# Patient Record
Sex: MALE | Race: WHITE | Employment: OTHER | URBAN - METROPOLITAN AREA
[De-identification: names, ages, dates, MRNs, and addresses within clinical notes are randomized per-mention and may not be internally consistent; named-entity substitution may affect disease eponyms.]

---

## 2017-06-06 ENCOUNTER — HOSPITAL ENCOUNTER (OUTPATIENT)
Dept: OTHER | Age: 52
Discharge: HOME | End: 2017-06-30
Attending: FAMILY MEDICINE | Admitting: FAMILY MEDICINE

## 2017-06-08 ENCOUNTER — HOSPITAL ENCOUNTER (OUTPATIENT)
Dept: PHYSICAL THERAPY | Age: 52
Discharge: HOME | End: 2017-06-09
Admitting: FAMILY MEDICINE

## 2017-06-14 ENCOUNTER — HOSPITAL ENCOUNTER (OUTPATIENT)
Dept: PHYSICAL THERAPY | Age: 52
Discharge: HOME | End: 2017-06-15
Admitting: FAMILY MEDICINE

## 2019-06-12 ENCOUNTER — HOSPITAL ENCOUNTER (EMERGENCY)
Age: 54
Discharge: HOME | End: 2019-06-12
Payer: MEDICARE

## 2019-06-12 VITALS
RESPIRATION RATE: 18 BRPM | OXYGEN SATURATION: 96 % | WEIGHT: 217.15 LBS | DIASTOLIC BLOOD PRESSURE: 95 MMHG | HEIGHT: 76 IN | SYSTOLIC BLOOD PRESSURE: 168 MMHG | TEMPERATURE: 98.4 F | BODY MASS INDEX: 26.44 KG/M2 | HEART RATE: 81 BPM

## 2019-06-12 DIAGNOSIS — L02.511 ABSCESS OF RIGHT HAND: Primary | ICD-10-CM

## 2019-06-12 PROCEDURE — 87205 SMEAR GRAM STAIN: CPT

## 2019-06-12 PROCEDURE — 87070 CULTURE OTHR SPECIMN AEROBIC: CPT

## 2019-06-12 PROCEDURE — 87077 CULTURE AEROBIC IDENTIFY: CPT

## 2019-06-12 PROCEDURE — 4500000023 HC ED LEVEL 3 PROCEDURE

## 2019-06-12 PROCEDURE — 99283 EMERGENCY DEPT VISIT LOW MDM: CPT

## 2019-06-12 PROCEDURE — 87186 SC STD MICRODIL/AGAR DIL: CPT

## 2019-06-12 ASSESSMENT — PAIN DESCRIPTION - DESCRIPTORS: DESCRIPTORS: THROBBING

## 2019-06-12 ASSESSMENT — PAIN DESCRIPTION - LOCATION: LOCATION: HAND

## 2019-06-12 ASSESSMENT — PAIN SCALES - GENERAL: PAINLEVEL_OUTOF10: 5

## 2019-06-12 ASSESSMENT — PAIN DESCRIPTION - PAIN TYPE: TYPE: ACUTE PAIN

## 2019-06-12 ASSESSMENT — PAIN DESCRIPTION - ORIENTATION: ORIENTATION: RIGHT

## 2019-06-13 NOTE — ED PROVIDER NOTES
[address]       Pt Name: Danial Díaz  MRN: [id number]  [place] 1965  Date of evaluation: 6/12/2019  Provider: MONICA Olmos    The ED Attending Physician was available for consultation but did not see or evaluate this patient. CHIEF COMPLAINT       Chief Complaint   Patient presents with   [address]     reports spider bite 6 days ago. seen by pcp, started antibiotics and steroids then changed again, still not better, sent pcp. HISTORY OF PRESENT ILLNESS  (Location/Symptom, Timing/Onset, Context/Setting, Quality, Duration, Modifying Factors, Severity.)   Danial Díaz is a 62 y.o. male who presents to the emergency department with complaint of a right hand infection with a area of pain and swelling on the back of the hand. He says his symptoms started about 6 days ago, and he thinks he may have been bit by something but he is not sure, and he denies any injury to the hand. He says it began as a little bump but then it got bigger and more painful. He reported that he visited his family doctor, was started on one antibiotic and steroids, and he later called back because it was not getting much better and the doctor changed his antibiotic. He says he does not want to take the steroids anymore because they are giving him bad side effects. He reports that the infection now seems to be remaining the same, not getting much worse or better, but there is a large lump on the hand, and he is unable to drain anything out of it. He says it is quite painful, and initially this area of his hand was itchy but is no longer very itchy because it so painful. He denies numbness. Denies any symptoms anywhere else on the body or any other known recent infections. Denies any chronic abscesses or any diabetes or other relevant medical problems. No other complaints. Nursing Notes were reviewed and I agree.     REVIEW OF SYSTEMS    (2-9 systems for level 4, 10 or more for level 5)     Constitutional:  Negative for fever, chills. Respiratory:  Negative for cough, shortness of breath. Cardiovascular:  Negative for chest pain, palpitations. Gastrointestinal:  Negative for nausea, vomiting, abdominal pain. Genitourinary:  Negative for dysuria, hematuria, flank pain, and pelvic pain. Musculoskeletal: Positive for pain and swelling in the back of the right hand. Negative for myalgias, arthralgias, neck pain and neck stiffness. Neurological:  Negative for dizziness, weakness, light-headedness, numbness and headaches. Except as noted above the remainder of the review of systems was reviewed and negative. PAST MEDICAL HISTORY         Diagnosis Date    COPD (chronic obstructive pulmonary disease) ([facility] [address]     Emphysema lung (HCC)     GERD (gastroesophageal reflux disease)        SURGICAL HISTORY           Procedure Laterality Date    HERNIA REPAIR      ROTATOR CUFF REPAIR Bilateral        CURRENT MEDICATIONS       Previous Medications    DICLOFENAC (VOLTAREN) 75 MG EC TABLET    Take 75 mg by mouth 2 times daily. OXYCODONE-ACETAMINOPHEN (PERCOCET) 5-325 MG PER TABLET    Take 1 tablet by mouth every 4 hours as needed for Pain    SENNOSIDES (SENNA) 8.8 MG/5ML SYRP    Take 5 mLs by mouth nightly    SILODOSIN (RAPAFLO) 4 MG CAPS    Take 4 mg by mouth nightly       ALLERGIES     Patient has no known allergies. FAMILY HISTORY     No family history on file. No family status information on file. SOCIAL HISTORY      reports that he has quit smoking. His smoking use included cigarettes. He has a 38.00 pack-year smoking history. He does not have any smokeless tobacco history on file. He reports that he drinks alcohol. He reports that he does not use drugs.     PHYSICAL EXAM    (up to 7 for level 4, 8 or more for level 5)     ED Triage Vitals [06/12/19 2131]   BP Temp Temp Source Pulse Resp SpO2 Height Weight   (!) 168/95 98.4 °F (36.9 °C) Oral 81 18 96 % 6' 4\" (1.93 m) 217 lb 2.5 oz (98.5 kg)       Constitutional:  Appearing well-developed and well-nourished. No distress. HENT:  Normocephalic and atraumatic. Cardiovascular:  Normal rate, regular rhythm, normal heart sounds and intact distal pulses. Pulmonary/Chest:  Effort normal and breath sounds normal. No respiratory distress. Musculoskeletal: Approximately 3 cm round area of tenderness, swelling, erythema and fluctuance approximately over the distal fifth metacarpal, negative for active drainage, with normal examination of the surrounding joints. See image below. normal range of motion. No edema exhibited. Sensation to light touch grossly intact and capillary refill <3 seconds in the digits of the right upper extremity. Neurological:  Oriented to person, place, and time. No cranial nerve deficit. Skin:  Skin is warm and dry. Not diaphoretic. Psychiatric:  Normal mood, affect, behavior, judgment and thought content. DIAGNOSTIC RESULTS     RADIOLOGY:   Non-plain film images such as CT, Ultrasound and MRI are read by the radiologist. Plain radiographic images are visualized and preliminarily interpreted by MONICA Lebron with the below findings:    None. Interpretation per the Radiologist below, if available at the time of this note:    No orders to display       LABS:  [address]       All other labs were within normal range or not returned as of this dictation. EMERGENCY DEPARTMENT COURSE and DIFFERENTIAL DIAGNOSIS/MDM:   Vitals:    Vitals:    06/12/19 2131   BP: (!) 168/95   Pulse: 81   Resp: 18   Temp: 98.4 °F (36.9 °C)   TempSrc: Oral   SpO2: 96%   Weight: 217 lb 2.5 oz (98.5 kg)   Height: 6' 4\" (1.93 m)       The patient's condition in the ED was good, the patient was afebrile and nontoxic in appearance, and the patient's physical exam was unremarkable other than the right hand findings noted above.   Good neurovascular status throughout the right upper extremity. Patient is currently on Levaquin therapy. Incision and drainage was performed in the ED, with a good amount of pus drainage, and a sample of the discharge was sent to the lab for culture. There was no indication for hospitalization or further workup. Patient will be discharged with instructions to continue his antibiotic therapy and will be given instructions for wound care. The patient verbalized understanding and agreement with this plan of care. The patient was advised to return to the emergency department if symptoms should significantly worsen or if new and concerning symptoms should appear. I estimate there is LOW risk for SEVERE CELLULITIS, SEPSIS OR NECROTIZING FASCIITIS,  thus I consider the discharge disposition reasonable. PROCEDURES:  Incision and Drainage: The patient had a 3cm abscess located on the dorsal right hand. Incision and drainage procedure was explained to the patient and the patient gave verbal consent. The area was cleaned with an alcohol swab and anesthetized in a field block and under the abscess by injection with equal parts 1% lidocaine with epinephrine and 0.54% bupivicaine with epinephrine through a 27-gauge needle. The abscess was then prepped with surgical scrub and rinsed with sterile saline. The area of greatest fluctuance was identified and a #11 blade was used to make a 1cm straight incision. A blunt probe was used to deloculated the wound and a moderate amount of purulent material was expressed. The patient experienced some pain but generally tolerated the procedure well. 4\"x4\" gauzes were taped in place and the patient was given instructions to soak the affected area 3 times daily with a compress of warm soapy water for 15 minutes for the next 4-5 days at home and visit their primary care physician promptly for follow-up care.  The patient was told to keep the area covered when not soaking and shower normally but not submerge the affected area in water until the incision wound heals. The patient was also advised to expect bloody or thin drainage, and if the drainage is green or thick, or the area around the wound turns red or spreads, return to the emergency department. FINAL IMPRESSION      1.  Abscess of right hand          DISPOSITION/PLAN   DISPOSITION Decision To Discharge 06/12/2019 11:18:54 PM      PATIENT REFERRED TO:  Shane Denton MD  [address] [facility]  [phone]      continue your current plan of care      DISCHARGE MEDICATIONS:  New Prescriptions    No medications on file       (Please note that portions of this note were completed with a voice recognition program.  Efforts were made to edit the dictations but occasionally words are mis-transcribed.)    Axel Singh, [address] [facility], [address] [place] [address]  06/12/19 1027

## 2019-06-16 LAB
GRAM STAIN RESULT: ABNORMAL
ORGANISM: ABNORMAL
WOUND/ABSCESS: ABNORMAL
WOUND/ABSCESS: ABNORMAL

## 2019-06-16 NOTE — RESULT ENCOUNTER NOTE
Culture reviewed, please contact patient and inform them of the results. Call in Bactrim DS p.o. twice daily x10 days. Follow-up wound check with his primary doctor.

## 2019-06-16 NOTE — RESULT ENCOUNTER NOTE
Patient's positive result has been appropriately evaluated by the provider pool. Patient was contacted and notified of the results.    Rx called to [facility] in [address] [place] [address]

## 2023-12-04 ENCOUNTER — HOSPITAL ENCOUNTER (INPATIENT)
Age: 58
LOS: 1 days | Discharge: HOME | DRG: 321 | End: 2023-12-05
Attending: EMERGENCY MEDICINE | Admitting: INTERNAL MEDICINE
Payer: MEDICARE

## 2023-12-04 ENCOUNTER — APPOINTMENT (OUTPATIENT)
Dept: GENERAL RADIOLOGY | Age: 58
DRG: 321 | End: 2023-12-04
Payer: MEDICARE

## 2023-12-04 DIAGNOSIS — I21.3 ST ELEVATION MYOCARDIAL INFARCTION (STEMI), UNSPECIFIED ARTERY (HCC): Primary | ICD-10-CM

## 2023-12-04 PROBLEM — I21.09 ACUTE ANTERIOR MYOCARDIAL INFARCTION (HCC): Status: ACTIVE | Noted: 2023-12-04

## 2023-12-04 LAB
ANION GAP SERPL CALCULATED.3IONS-SCNC: 10 MMOL/L (ref 3–16)
APTT BLD: 24.3 SEC (ref 22.7–35.9)
BASOPHILS # BLD: 0 K/UL (ref 0–0.2)
BASOPHILS NFR BLD: 0.3 %
BUN SERPL-MCNC: 18 MG/DL (ref 7–20)
CALCIUM SERPL-MCNC: 9.7 MG/DL (ref 8.3–10.6)
CHLORIDE SERPL-SCNC: 101 MMOL/L (ref 99–110)
CO2 SERPL-SCNC: 25 MMOL/L (ref 21–32)
CREAT SERPL-MCNC: 1.4 MG/DL (ref 0.8–1.3)
DEPRECATED RDW RBC AUTO: 11.6 % (ref 12.4–15.4)
EKG ATRIAL RATE: 65 BPM
EKG DIAGNOSIS: NORMAL
EKG P AXIS: 82 DEGREES
EKG P-R INTERVAL: 158 MS
EKG Q-T INTERVAL: 384 MS
EKG QRS DURATION: 158 MS
EKG QTC CALCULATION (BAZETT): 399 MS
EKG R AXIS: -47 DEGREES
EKG T AXIS: 41 DEGREES
EKG VENTRICULAR RATE: 65 BPM
EOSINOPHIL # BLD: 0.2 K/UL (ref 0–0.6)
EOSINOPHIL NFR BLD: 1.9 %
GFR SERPLBLD CREATININE-BSD FMLA CKD-EPI: 57 ML/MIN/{1.73_M2}
GLUCOSE SERPL-MCNC: 113 MG/DL (ref 70–99)
HCT VFR BLD AUTO: 52.6 % (ref 40.5–52.5)
HGB BLD-MCNC: 18.6 G/DL (ref 13.5–17.5)
IMM GRANULOCYTES # BLD: 0 K/UL (ref 0–0.2)
IMM GRANULOCYTES NFR BLD: 0.2 %
INR PPP: 0.95 (ref 0.84–1.16)
LYMPHOCYTES # BLD: 1.8 K/UL (ref 1–5.1)
LYMPHOCYTES NFR BLD: 19.8 %
MCH RBC QN AUTO: 32.5 PG (ref 26–34)
MCHC RBC AUTO-ENTMCNC: 35.4 G/DL (ref 32–36.4)
MCV RBC AUTO: 92 FL (ref 80–100)
MONOCYTES # BLD: 0.8 K/UL (ref 0–1.3)
MONOCYTES NFR BLD: 9.3 %
NEUTROPHILS # BLD: 6.2 K/UL (ref 1.7–7.7)
NEUTROPHILS NFR BLD: 68.5 %
NT-PROBNP SERPL-MCNC: <36 PG/ML (ref 0–124)
PLATELET # BLD AUTO: 198 K/UL (ref 135–450)
PMV BLD AUTO: 8.8 FL (ref 5–10.5)
POC ACT LR: >400 SEC
POTASSIUM SERPL-SCNC: 3.6 MMOL/L (ref 3.5–5.1)
PROTHROMBIN TIME: 12.7 SEC (ref 11.5–14.8)
RBC # BLD AUTO: 5.72 M/UL (ref 4.2–5.9)
SODIUM SERPL-SCNC: 136 MMOL/L (ref 136–145)
TROPONIN, HIGH SENSITIVITY: 11 NG/L (ref 0–22)
WBC # BLD AUTO: 9 K/UL (ref 4–11)

## 2023-12-04 PROCEDURE — B2151ZZ FLUOROSCOPY OF LEFT HEART USING LOW OSMOLAR CONTRAST: ICD-10-PCS | Performed by: INTERNAL MEDICINE

## 2023-12-04 PROCEDURE — 83880 ASSAY OF NATRIURETIC PEPTIDE: CPT

## 2023-12-04 PROCEDURE — 92921 PR PRQ TRLUML CORONARY ANGIOPLASTY ADDL BRANCH: CPT | Performed by: INTERNAL MEDICINE

## 2023-12-04 PROCEDURE — C1769 GUIDE WIRE: HCPCS | Performed by: INTERNAL MEDICINE

## 2023-12-04 PROCEDURE — 71045 X-RAY EXAM CHEST 1 VIEW: CPT

## 2023-12-04 PROCEDURE — 92978 ENDOLUMINL IVUS OCT C 1ST: CPT

## 2023-12-04 PROCEDURE — 36415 COLL VENOUS BLD VENIPUNCTURE: CPT

## 2023-12-04 PROCEDURE — 96375 TX/PRO/DX INJ NEW DRUG ADDON: CPT

## 2023-12-04 PROCEDURE — 92941 PRQ TRLML REVSC TOT OCCL AMI: CPT

## 2023-12-04 PROCEDURE — 85347 COAGULATION TIME ACTIVATED: CPT

## 2023-12-04 PROCEDURE — 6360000002 HC RX W HCPCS

## 2023-12-04 PROCEDURE — B41F1ZZ FLUOROSCOPY OF RIGHT LOWER EXTREMITY ARTERIES USING LOW OSMOLAR CONTRAST: ICD-10-PCS | Performed by: INTERNAL MEDICINE

## 2023-12-04 PROCEDURE — C1894 INTRO/SHEATH, NON-LASER: HCPCS | Performed by: INTERNAL MEDICINE

## 2023-12-04 PROCEDURE — 2580000003 HC RX 258: Performed by: INTERNAL MEDICINE

## 2023-12-04 PROCEDURE — 99223 1ST HOSP IP/OBS HIGH 75: CPT | Performed by: INTERNAL MEDICINE

## 2023-12-04 PROCEDURE — 2709999900 HC NON-CHARGEABLE SUPPLY: Performed by: INTERNAL MEDICINE

## 2023-12-04 PROCEDURE — 85025 COMPLETE CBC W/AUTO DIFF WBC: CPT

## 2023-12-04 PROCEDURE — 027034Z DILATION OF CORONARY ARTERY, ONE ARTERY WITH DRUG-ELUTING INTRALUMINAL DEVICE, PERCUTANEOUS APPROACH: ICD-10-PCS | Performed by: INTERNAL MEDICINE

## 2023-12-04 PROCEDURE — 85610 PROTHROMBIN TIME: CPT

## 2023-12-04 PROCEDURE — 80048 BASIC METABOLIC PNL TOTAL CA: CPT

## 2023-12-04 PROCEDURE — 2500000003 HC RX 250 WO HCPCS

## 2023-12-04 PROCEDURE — C1725 CATH, TRANSLUMIN NON-LASER: HCPCS | Performed by: INTERNAL MEDICINE

## 2023-12-04 PROCEDURE — 93458 L HRT ARTERY/VENTRICLE ANGIO: CPT | Performed by: INTERNAL MEDICINE

## 2023-12-04 PROCEDURE — 99153 MOD SED SAME PHYS/QHP EA: CPT

## 2023-12-04 PROCEDURE — 6370000000 HC RX 637 (ALT 250 FOR IP): Performed by: INTERNAL MEDICINE

## 2023-12-04 PROCEDURE — 4A023N7 MEASUREMENT OF CARDIAC SAMPLING AND PRESSURE, LEFT HEART, PERCUTANEOUS APPROACH: ICD-10-PCS | Performed by: INTERNAL MEDICINE

## 2023-12-04 PROCEDURE — 92921 HC PRQ CARDIAC ANGIO ADDL ART: CPT

## 2023-12-04 PROCEDURE — 6360000002 HC RX W HCPCS: Performed by: EMERGENCY MEDICINE

## 2023-12-04 PROCEDURE — 6370000000 HC RX 637 (ALT 250 FOR IP): Performed by: EMERGENCY MEDICINE

## 2023-12-04 PROCEDURE — 84484 ASSAY OF TROPONIN QUANT: CPT

## 2023-12-04 PROCEDURE — 96374 THER/PROPH/DIAG INJ IV PUSH: CPT

## 2023-12-04 PROCEDURE — 85730 THROMBOPLASTIN TIME PARTIAL: CPT

## 2023-12-04 PROCEDURE — 2100000000 HC CCU R&B

## 2023-12-04 PROCEDURE — C1874 STENT, COATED/COV W/DEL SYS: HCPCS | Performed by: INTERNAL MEDICINE

## 2023-12-04 PROCEDURE — 6360000004 HC RX CONTRAST MEDICATION: Performed by: INTERNAL MEDICINE

## 2023-12-04 PROCEDURE — 92928 PRQ TCAT PLMT NTRAC ST 1 LES: CPT | Performed by: INTERNAL MEDICINE

## 2023-12-04 PROCEDURE — B240ZZ3 ULTRASONOGRAPHY OF SINGLE CORONARY ARTERY, INTRAVASCULAR: ICD-10-PCS | Performed by: INTERNAL MEDICINE

## 2023-12-04 PROCEDURE — 93458 L HRT ARTERY/VENTRICLE ANGIO: CPT

## 2023-12-04 PROCEDURE — B2111ZZ FLUOROSCOPY OF MULTIPLE CORONARY ARTERIES USING LOW OSMOLAR CONTRAST: ICD-10-PCS | Performed by: INTERNAL MEDICINE

## 2023-12-04 PROCEDURE — 99152 MOD SED SAME PHYS/QHP 5/>YRS: CPT | Performed by: INTERNAL MEDICINE

## 2023-12-04 PROCEDURE — 99152 MOD SED SAME PHYS/QHP 5/>YRS: CPT

## 2023-12-04 PROCEDURE — 93010 ELECTROCARDIOGRAM REPORT: CPT | Performed by: INTERNAL MEDICINE

## 2023-12-04 PROCEDURE — 99285 EMERGENCY DEPT VISIT HI MDM: CPT

## 2023-12-04 PROCEDURE — C1887 CATHETER, GUIDING: HCPCS | Performed by: INTERNAL MEDICINE

## 2023-12-04 PROCEDURE — 94760 N-INVAS EAR/PLS OXIMETRY 1: CPT

## 2023-12-04 PROCEDURE — 92978 ENDOLUMINL IVUS OCT C 1ST: CPT | Performed by: INTERNAL MEDICINE

## 2023-12-04 PROCEDURE — C1753 CATH, INTRAVAS ULTRASOUND: HCPCS | Performed by: INTERNAL MEDICINE

## 2023-12-04 PROCEDURE — 93005 ELECTROCARDIOGRAM TRACING: CPT | Performed by: EMERGENCY MEDICINE

## 2023-12-04 RX ORDER — ROSUVASTATIN CALCIUM 20 MG/1
TABLET, COATED ORAL
COMMUNITY
Start: 2022-10-17

## 2023-12-04 RX ORDER — HEPARIN SODIUM 5000 [USP'U]/ML
6000 INJECTION, SOLUTION INTRAVENOUS; SUBCUTANEOUS EVERY 8 HOURS SCHEDULED
Status: DISCONTINUED | OUTPATIENT
Start: 2023-12-04 | End: 2023-12-04

## 2023-12-04 RX ORDER — ASPIRIN 81 MG/1
81 TABLET, CHEWABLE ORAL DAILY
Status: DISCONTINUED | OUTPATIENT
Start: 2023-12-05 | End: 2023-12-05 | Stop reason: HOSPADM

## 2023-12-04 RX ORDER — MONTELUKAST SODIUM 10 MG/1
10 TABLET ORAL NIGHTLY
Status: DISCONTINUED | OUTPATIENT
Start: 2023-12-04 | End: 2023-12-05 | Stop reason: HOSPADM

## 2023-12-04 RX ORDER — SENNOSIDES 8.8 MG/5ML
5 LIQUID ORAL
Status: DISCONTINUED | OUTPATIENT
Start: 2023-12-04 | End: 2023-12-05 | Stop reason: HOSPADM

## 2023-12-04 RX ORDER — TAMSULOSIN HYDROCHLORIDE 0.4 MG/1
0.4 CAPSULE ORAL DAILY
Status: DISCONTINUED | OUTPATIENT
Start: 2023-12-05 | End: 2023-12-05 | Stop reason: HOSPADM

## 2023-12-04 RX ORDER — 0.9 % SODIUM CHLORIDE 0.9 %
250 INTRAVENOUS SOLUTION INTRAVENOUS PRN
Status: DISCONTINUED | OUTPATIENT
Start: 2023-12-04 | End: 2023-12-05 | Stop reason: HOSPADM

## 2023-12-04 RX ORDER — ZOLPIDEM TARTRATE 10 MG/1
TABLET ORAL
COMMUNITY
Start: 2022-12-11

## 2023-12-04 RX ORDER — ATROPINE SULFATE 1 MG/ML
0.5 INJECTION, SOLUTION INTRAVENOUS
Status: DISCONTINUED | OUTPATIENT
Start: 2023-12-04 | End: 2023-12-05 | Stop reason: HOSPADM

## 2023-12-04 RX ORDER — ZOLPIDEM TARTRATE 5 MG/1
5 TABLET ORAL NIGHTLY PRN
Status: DISCONTINUED | OUTPATIENT
Start: 2023-12-04 | End: 2023-12-05 | Stop reason: HOSPADM

## 2023-12-04 RX ORDER — HEPARIN SODIUM 1000 [USP'U]/ML
6000 INJECTION, SOLUTION INTRAVENOUS; SUBCUTANEOUS ONCE
Status: COMPLETED | OUTPATIENT
Start: 2023-12-04 | End: 2023-12-04

## 2023-12-04 RX ORDER — TESTOSTERONE CYPIONATE 100 MG/ML
INJECTION, SOLUTION INTRAMUSCULAR
COMMUNITY

## 2023-12-04 RX ORDER — SODIUM CHLORIDE 0.9 % (FLUSH) 0.9 %
5-40 SYRINGE (ML) INJECTION EVERY 12 HOURS SCHEDULED
Status: DISCONTINUED | OUTPATIENT
Start: 2023-12-04 | End: 2023-12-05 | Stop reason: HOSPADM

## 2023-12-04 RX ORDER — ROSUVASTATIN CALCIUM 40 MG/1
40 TABLET, COATED ORAL NIGHTLY
Status: DISCONTINUED | OUTPATIENT
Start: 2023-12-04 | End: 2023-12-05 | Stop reason: HOSPADM

## 2023-12-04 RX ORDER — CALCIUM CARBONATE 500 MG/1
500 TABLET, CHEWABLE ORAL 3 TIMES DAILY PRN
Status: DISCONTINUED | OUTPATIENT
Start: 2023-12-04 | End: 2023-12-05 | Stop reason: HOSPADM

## 2023-12-04 RX ORDER — HYDRALAZINE HYDROCHLORIDE 20 MG/ML
10 INJECTION INTRAMUSCULAR; INTRAVENOUS
Status: DISCONTINUED | OUTPATIENT
Start: 2023-12-04 | End: 2023-12-05 | Stop reason: HOSPADM

## 2023-12-04 RX ORDER — ACETAMINOPHEN 325 MG/1
650 TABLET ORAL EVERY 4 HOURS PRN
Status: DISCONTINUED | OUTPATIENT
Start: 2023-12-04 | End: 2023-12-05 | Stop reason: HOSPADM

## 2023-12-04 RX ORDER — SODIUM CHLORIDE 9 MG/ML
INJECTION, SOLUTION INTRAVENOUS PRN
Status: DISCONTINUED | OUTPATIENT
Start: 2023-12-04 | End: 2023-12-05 | Stop reason: HOSPADM

## 2023-12-04 RX ORDER — DEXTROAMPHETAMINE SACCHARATE, AMPHETAMINE ASPARTATE, DEXTROAMPHETAMINE SULFATE AND AMPHETAMINE SULFATE 5; 5; 5; 5 MG/1; MG/1; MG/1; MG/1
1 TABLET ORAL
COMMUNITY
Start: 2023-09-15

## 2023-12-04 RX ORDER — CELECOXIB 200 MG/1
200 CAPSULE ORAL DAILY
Status: ON HOLD | COMMUNITY
Start: 2016-10-17 | End: 2023-12-05 | Stop reason: HOSPADM

## 2023-12-04 RX ORDER — ONDANSETRON 2 MG/ML
4 INJECTION INTRAMUSCULAR; INTRAVENOUS EVERY 6 HOURS PRN
Status: DISCONTINUED | OUTPATIENT
Start: 2023-12-04 | End: 2023-12-05 | Stop reason: HOSPADM

## 2023-12-04 RX ORDER — HEPARIN SODIUM 1000 [USP'U]/ML
INJECTION, SOLUTION INTRAVENOUS; SUBCUTANEOUS
Status: COMPLETED
Start: 2023-12-04 | End: 2023-12-04

## 2023-12-04 RX ORDER — SODIUM CHLORIDE 0.9 % (FLUSH) 0.9 %
5-40 SYRINGE (ML) INJECTION PRN
Status: DISCONTINUED | OUTPATIENT
Start: 2023-12-04 | End: 2023-12-05 | Stop reason: HOSPADM

## 2023-12-04 RX ORDER — MORPHINE SULFATE 2 MG/ML
2 INJECTION, SOLUTION INTRAMUSCULAR; INTRAVENOUS
Status: DISCONTINUED | OUTPATIENT
Start: 2023-12-04 | End: 2023-12-05 | Stop reason: HOSPADM

## 2023-12-04 RX ORDER — MORPHINE SULFATE 4 MG/ML
4 INJECTION, SOLUTION INTRAMUSCULAR; INTRAVENOUS ONCE
Status: COMPLETED | OUTPATIENT
Start: 2023-12-04 | End: 2023-12-04

## 2023-12-04 RX ORDER — ONDANSETRON 2 MG/ML
4 INJECTION INTRAMUSCULAR; INTRAVENOUS ONCE
Status: COMPLETED | OUTPATIENT
Start: 2023-12-04 | End: 2023-12-04

## 2023-12-04 RX ORDER — MONTELUKAST SODIUM 10 MG/1
1 TABLET ORAL NIGHTLY
COMMUNITY
Start: 2023-08-28

## 2023-12-04 RX ORDER — ASPIRIN 81 MG/1
324 TABLET, CHEWABLE ORAL ONCE
Status: COMPLETED | OUTPATIENT
Start: 2023-12-04 | End: 2023-12-04

## 2023-12-04 RX ADMIN — ROSUVASTATIN CALCIUM 40 MG: 40 TABLET, FILM COATED ORAL at 20:31

## 2023-12-04 RX ADMIN — IOPAMIDOL 200 ML: 612 INJECTION, SOLUTION INTRAVENOUS at 15:59

## 2023-12-04 RX ADMIN — Medication 10 ML: at 20:36

## 2023-12-04 RX ADMIN — TICAGRELOR 90 MG: 90 TABLET ORAL at 20:30

## 2023-12-04 RX ADMIN — HEPARIN SODIUM 6000 UNITS: 1000 INJECTION INTRAVENOUS; SUBCUTANEOUS at 14:42

## 2023-12-04 RX ADMIN — ANTACID TABLETS 500 MG: 500 TABLET, CHEWABLE ORAL at 20:35

## 2023-12-04 RX ADMIN — MONTELUKAST 10 MG: 10 TABLET, FILM COATED ORAL at 20:30

## 2023-12-04 RX ADMIN — ACETAMINOPHEN 650 MG: 325 TABLET ORAL at 20:31

## 2023-12-04 RX ADMIN — MORPHINE SULFATE 4 MG: 4 INJECTION, SOLUTION INTRAMUSCULAR; INTRAVENOUS at 14:38

## 2023-12-04 RX ADMIN — ONDANSETRON 4 MG: 2 INJECTION INTRAMUSCULAR; INTRAVENOUS at 14:37

## 2023-12-04 RX ADMIN — ASPIRIN 324 MG: 81 TABLET, CHEWABLE ORAL at 14:38

## 2023-12-04 RX ADMIN — ZOLPIDEM TARTRATE 5 MG: 5 TABLET ORAL at 20:30

## 2023-12-04 RX ADMIN — HEPARIN SODIUM 6000 UNITS: 1000 INJECTION, SOLUTION INTRAVENOUS; SUBCUTANEOUS at 14:42

## 2023-12-04 RX ADMIN — TICAGRELOR 180 MG: 90 TABLET ORAL at 14:42

## 2023-12-04 ASSESSMENT — LIFESTYLE VARIABLES
HOW OFTEN DO YOU HAVE A DRINK CONTAINING ALCOHOL: NEVER
HOW MANY STANDARD DRINKS CONTAINING ALCOHOL DO YOU HAVE ON A TYPICAL DAY: PATIENT DOES NOT DRINK

## 2023-12-04 ASSESSMENT — PAIN SCALES - GENERAL
PAINLEVEL_OUTOF10: 8
PAINLEVEL_OUTOF10: 8
PAINLEVEL_OUTOF10: 3
PAINLEVEL_OUTOF10: 3

## 2023-12-04 ASSESSMENT — PAIN - FUNCTIONAL ASSESSMENT: PAIN_FUNCTIONAL_ASSESSMENT: 0-10

## 2023-12-04 ASSESSMENT — PAIN DESCRIPTION - ORIENTATION
ORIENTATION: MID
ORIENTATION: RIGHT

## 2023-12-04 ASSESSMENT — PAIN DESCRIPTION - LOCATION
LOCATION: CHEST
LOCATION: LEG

## 2023-12-04 ASSESSMENT — PAIN DESCRIPTION - PAIN TYPE: TYPE: ACUTE PAIN

## 2023-12-04 ASSESSMENT — PAIN DESCRIPTION - FREQUENCY: FREQUENCY: CONTINUOUS

## 2023-12-04 ASSESSMENT — PAIN DESCRIPTION - DESCRIPTORS
DESCRIPTORS: SORE;PRESSURE
DESCRIPTORS: ACHING;SHARP;PRESSURE

## 2023-12-04 NOTE — ED NOTES
Labs/results noted. Please see telephone encounter dated 12/28/2021.   Report to cath lab [facility], [facility] RN given by Dr Navya Jimenez.      Mirtha Aceves, MYRTLE  12/04/23 1721

## 2023-12-04 NOTE — PROCEDURES
[address], [address] [place] [address] Way                            CARDIAC CATHETERIZATION    PATIENT NAME: Brielle Onofre                 :        1965  MED REC NO:   [id number]                          ROOM:       5  ACCOUNT NO:   [de-identified]                           ADMIT DATE: 2023  PROVIDER:     Giselle Gottlieb MD    DATE OF PROCEDURE:  2023    INDICATION FOR PROCEDURE:  Acute anterior myocardial infarction. DESCRIPTION OF PROCEDURE:  The risks and benefits of the procedure were  explained to the patient. Informed consent was obtained. He was placed  on the cardiac catheterization table and prepped and draped in sterile  fashion. He had been brought emergently from the [facility],  where he had presented with chest pain and has demonstrated an acute  anterior myocardial infarction. Anesthesia was provided in the right groin with 1% lidocaine injected  subcutaneously and deep. The right common femoral artery was accessed  and cannulated and a 6-Saudi Arabian sheath inserted using Seldinger technique. Over the 0.035 J-wire, the JR4 catheter was advanced to the ostium of  the right coronary artery. Coronary angiography was performed to this  system. The catheter was removed over the wire. Next, the EBU3.5,  6-S[place] catheter was advanced to the ostium of the left main coronary  artery. Coronary angiography was performed to this system. Next, we  set about intervene upon the proximal LAD lesion, which was partially  in-sent and also before the stent. The LAD was wired with a Runthrough 0.014 coronary wire. We then  predilated the lesion with a 3-mm balloon. We then performed  intravascular ultrasound using the [facility] IVUS catheter.    Procedure was performed and IV unfractionated heparin and ACTs were  checked throughout the case to maintain an ACT greater than 300

## 2023-12-04 NOTE — PROGRESS NOTES
PATIENT HISTORY    ECHO: DATE: na       EF:  na  STRESS TEST PREFORMED:  No  EKG: Yes    ECG     Result: Abnormal ST deviation>=0.5 mm  Pre CATH Rhythm: Normal Sinus Rhythm  HYPERTENSION: No  DYSLIPIDEMIA: No  FAMILY HX OF CAD: No  PRIOR MI: No  PRIOR PCI: Yes. Most recent date: na  PRIOR CABG: No  CEREBROVASCULAR DX: No  PERIPHERAL ARTERIAL DISEASE: No  CHRONIC LUNG DISEASE: Yes  TOBACCO: Former. Quit Date: unknown  DIABETIC: No  CARDIAC ARREST: {No  DIALYSIS: No  HEART FAILURE: No  FRAILTY SCORE: 6 MODERATELY FRAIL (need help with all outside activities and housekeeping, often have problems with stairs, bathing, dressing)  CARDIAC CTA PREFORMED:  No  [place] CORONARY CALCIUM SCORE:   Assessed: No  Prior Diagnostic Coronary Angioplasty Procedure:   No

## 2023-12-04 NOTE — PROGRESS NOTES
Pt arrived to room 1309 from Cath Lab. Pt is alert and oriented in bed. VSS. Call light within reach of patient. Will continue to monitor.      Electronically signed by Tori Mcburney, RN on 12/4/2023 at 4:34 PM

## 2023-12-04 NOTE — ED PROVIDER NOTES
[address] [facility]    Pt Name: Isma Vega   MRN: [id number]   [address] [facility] 1965   Date of evaluation: 12/4/2023   Provider: Juan Luis Collins MD   PCP: Jemima Marcum MD   Note Started: 2:37 PM EST 12/4/23       CHIEF COMPLAINT  Chest Pain (Pt states he left the gym 30 mins ago and started having mid sternal chest pain with sob. Pt is diaphoretic on arrival.  Dr Brendon Swanson at bedside. EKG obtained. Pt placed on cardiac monitor. IV established. Blood work obtained.)       HISTORY OF PRESENT ILLNESS  Isma Vega is a 59 y.o. male who  has a past medical history of COPD (chronic obstructive pulmonary disease) ([address]), Emphysema lung ([address]), GERD (gastroesophageal reflux disease), and MRSA (methicillin resistant staph aureus) culture positive. who presents to the ED complaining of chest pain. Quality burning. Severity initially 10 but on leaving the ED by ambulance to the Cath Lab it was a 3. He had burning shortness of breath he was diaphoretic. Has a known history of coronary artery disease with previous stents placed. I have reviewed the following from the nursing documentation:        HISTORY :      Past Medical History:   Diagnosis Date    COPD (chronic obstructive pulmonary disease) (HCC)     Emphysema lung (HCC)     GERD (gastroesophageal reflux disease)     MRSA (methicillin resistant staph aureus) culture positive 06/12/2019    hand     Past Surgical History:   Procedure Laterality Date    HERNIA REPAIR      ROTATOR CUFF REPAIR Bilateral      No family history on file.   Social History     Socioeconomic History    Marital status:      Spouse name: Not on file    Number of children: Not on file    Years of education: Not on file    Highest education level: Not on file   Occupational History    Not on file   Tobacco Use    Smoking status: Former     Packs/day: 1.00     Years: 38.00     Additional pack years: 0.00     Total pack years: 38.00

## 2023-12-04 NOTE — ED NOTES
[facility] at bedside to transport pt to [facility] lab.      Lisbeth Wiseman RN  12/04/23 0910

## 2023-12-04 NOTE — H&P
[address] [facility]  1965    December 4, 2023    CC: Chest Pain    HPI:  The patient is 59 y.o. male with a past medical history significant for prior tobacco abuse and COPD who presented to [facility] ED with chest pain. He was felt to have an acute anterior MI there and a STEMI was called. He had been at the gym exercising but started having chest pain when he left. He was sweating. On arrival, he states that he is still having burning in his chest but the chest pain is better. He relates that his stent was a year ago at [facility]. He relates that he has had some issues recently. Review of Systems:  Constitutional: No fatigue, weakness, night sweats or fever. HEENT: No new vision difficulties or ringing in the ears. Respiratory: No new SOB, PND, orthopnea or cough. Cardiovascular: See HPI   GI: No n/v, diarrhea, constipation, abdominal pain or changes in bowel habits. No melena, no hematochezia  : No urinary frequency, urgency, incontinence, hematuria or dysuria. Skin: No cyanosis or skin lesions. Musculoskeletal: No new muscle or joint pain. Neurological: No syncope or TIA-like symptoms. Psychiatric: No anxiety, insomnia or depression     Past Medical History:   Diagnosis Date    COPD (chronic obstructive pulmonary disease) (HCC)     Emphysema lung (HCC)     GERD (gastroesophageal reflux disease)     MRSA (methicillin resistant staph aureus) culture positive 06/12/2019    hand     Past Surgical History:   Procedure Laterality Date    HERNIA REPAIR      ROTATOR CUFF REPAIR Bilateral      Family history:  No family pertinent history in this gentleman with existing CAD.      Social History     Tobacco Use    Smoking status: Former     Packs/day: 1.00     Years: 38.00     Additional pack years: 0.00     Total pack years: 38.00     Types: Cigarettes   Substance Use Topics    Alcohol use: Yes     Comment: seldom    Drug use: No       No Known deficit. Coordination normal.   Skin: Skin is warm and dry. There is no rash or diaphoresis. Psychiatric: He has a normal mood and affect. His speech is normal and behavior is normal.     Personally reviewed and interpreted   EKG Interpretation: Sinus rhythm with acute anterior ischemia    Lab Review:   No results found for: \"TRIG\", \"HDL\", \"LDLCALC\", \"LDLDIRECT\", \"LABVLDL\"  Lab Results   Component Value Date/Time     12/04/2023 02:31 PM    K 3.6 12/04/2023 02:31 PM    BUN 18 12/04/2023 02:31 PM    CREATININE 1.4 12/04/2023 02:31 PM     Recent Labs     12/04/23  1431   WBC 9.0   HGB 18.6*   HCT 52.6*          Assessment / Plan:  Acute Anterior Myocardial Infarction  Continue aspirin and Brilinta s/p PCI to 80% proximal LAD lesion. IVUS assisted and stented with a 4mm X 15mm Ankit Simpson HUNTER dilated to 4.2cm proximally. Holding beta blockade at patient's request.  LVEF intact at 55%. Hyperlipidemia with goal LDL<70mg/dL  Continue high dose statin therapy with rosuvastatin 40mg daily. Thank you very much for allowing me to participate in the care of your patient.

## 2023-12-05 VITALS
SYSTOLIC BLOOD PRESSURE: 131 MMHG | DIASTOLIC BLOOD PRESSURE: 85 MMHG | HEIGHT: 76 IN | BODY MASS INDEX: 28.62 KG/M2 | WEIGHT: 235.01 LBS | HEART RATE: 82 BPM | TEMPERATURE: 98.4 F | RESPIRATION RATE: 15 BRPM | OXYGEN SATURATION: 94 %

## 2023-12-05 PROBLEM — E78.5 HYPERLIPIDEMIA LDL GOAL <70: Status: ACTIVE | Noted: 2023-12-05

## 2023-12-05 PROBLEM — I10 ESSENTIAL HYPERTENSION: Status: ACTIVE | Noted: 2023-12-05

## 2023-12-05 LAB
ANION GAP SERPL CALCULATED.3IONS-SCNC: 9 MMOL/L (ref 3–16)
BUN SERPL-MCNC: 17 MG/DL (ref 7–20)
CALCIUM SERPL-MCNC: 8.8 MG/DL (ref 8.3–10.6)
CHLORIDE SERPL-SCNC: 99 MMOL/L (ref 99–110)
CHOLEST SERPL-MCNC: 81 MG/DL (ref 0–199)
CO2 SERPL-SCNC: 24 MMOL/L (ref 21–32)
CREAT SERPL-MCNC: 1.2 MG/DL (ref 0.8–1.3)
DEPRECATED RDW RBC AUTO: 12.5 % (ref 12.4–15.4)
GFR SERPLBLD CREATININE-BSD FMLA CKD-EPI: >60 ML/MIN/{1.73_M2}
GLUCOSE SERPL-MCNC: 106 MG/DL (ref 70–99)
HCT VFR BLD AUTO: 48.5 % (ref 40.5–52.5)
HDLC SERPL-MCNC: 32 MG/DL (ref 40–60)
HGB BLD-MCNC: 17.1 G/DL (ref 13.5–17.5)
LDLC SERPL CALC-MCNC: 32 MG/DL
MCH RBC QN AUTO: 32.9 PG (ref 26–34)
MCHC RBC AUTO-ENTMCNC: 35.2 G/DL (ref 31–36)
MCV RBC AUTO: 93.5 FL (ref 80–100)
PLATELET # BLD AUTO: 182 K/UL (ref 135–450)
PMV BLD AUTO: 7.7 FL (ref 5–10.5)
POC ACT LR: 189 SEC
POTASSIUM SERPL-SCNC: 3.9 MMOL/L (ref 3.5–5.1)
RBC # BLD AUTO: 5.18 M/UL (ref 4.2–5.9)
SODIUM SERPL-SCNC: 132 MMOL/L (ref 136–145)
TRIGL SERPL-MCNC: 86 MG/DL (ref 0–150)
TROPONIN, HIGH SENSITIVITY: 562 NG/L (ref 0–22)
VLDLC SERPL CALC-MCNC: 17 MG/DL
WBC # BLD AUTO: 8 K/UL (ref 4–11)

## 2023-12-05 PROCEDURE — 80061 LIPID PANEL: CPT

## 2023-12-05 PROCEDURE — 94760 N-INVAS EAR/PLS OXIMETRY 1: CPT

## 2023-12-05 PROCEDURE — 80048 BASIC METABOLIC PNL TOTAL CA: CPT

## 2023-12-05 PROCEDURE — 84484 ASSAY OF TROPONIN QUANT: CPT

## 2023-12-05 PROCEDURE — 94660 CPAP INITIATION&MGMT: CPT

## 2023-12-05 PROCEDURE — 85027 COMPLETE CBC AUTOMATED: CPT

## 2023-12-05 PROCEDURE — 36415 COLL VENOUS BLD VENIPUNCTURE: CPT

## 2023-12-05 PROCEDURE — 99239 HOSP IP/OBS DSCHRG MGMT >30: CPT | Performed by: INTERNAL MEDICINE

## 2023-12-05 PROCEDURE — 6370000000 HC RX 637 (ALT 250 FOR IP): Performed by: INTERNAL MEDICINE

## 2023-12-05 RX ORDER — VALSARTAN 40 MG/1
40 TABLET ORAL DAILY
Qty: 30 TABLET | Refills: 3 | Status: SHIPPED | OUTPATIENT
Start: 2023-12-05

## 2023-12-05 RX ORDER — VALSARTAN 80 MG/1
40 TABLET ORAL DAILY
Status: DISCONTINUED | OUTPATIENT
Start: 2023-12-05 | End: 2023-12-05 | Stop reason: HOSPADM

## 2023-12-05 RX ORDER — ASPIRIN 81 MG/1
81 TABLET, CHEWABLE ORAL DAILY
Qty: 30 TABLET | Refills: 3 | Status: SHIPPED | OUTPATIENT
Start: 2023-12-05

## 2023-12-05 RX ADMIN — VALSARTAN 40 MG: 80 TABLET, COATED ORAL at 10:46

## 2023-12-05 RX ADMIN — TICAGRELOR 90 MG: 90 TABLET ORAL at 09:02

## 2023-12-05 RX ADMIN — TAMSULOSIN HYDROCHLORIDE 0.4 MG: 0.4 CAPSULE ORAL at 09:02

## 2023-12-05 RX ADMIN — ANTACID TABLETS 500 MG: 500 TABLET, CHEWABLE ORAL at 00:58

## 2023-12-05 RX ADMIN — ASPIRIN 81 MG: 81 TABLET, CHEWABLE ORAL at 09:02

## 2023-12-05 RX ADMIN — ANTACID TABLETS 500 MG: 500 TABLET, CHEWABLE ORAL at 09:02

## 2023-12-05 ASSESSMENT — PAIN SCALES - GENERAL
PAINLEVEL_OUTOF10: 0
PAINLEVEL_OUTOF10: 2

## 2023-12-05 ASSESSMENT — PAIN DESCRIPTION - ORIENTATION: ORIENTATION: MID

## 2023-12-05 ASSESSMENT — PAIN DESCRIPTION - LOCATION: LOCATION: BACK

## 2023-12-05 NOTE — PROGRESS NOTES
Patient discharged per provider order, all LDA's removed, patient educated on disease processes and signs of exacerbation. Patient educated on medication changes and importance of adherence to regiment. Patient notified of follow-up appointment. Patient taken off floor via wheelchair to daughter, all belongings taken with patient at discharge.

## 2023-12-05 NOTE — DISCHARGE SUMMARY
[address] [facility] Discharge Note      Patient ID: Summer Diana 59 y.o. 1965    Admission Date: 12/4/2023     Discharge Date:  12/5/23    Reason for Admission:  Principal Diagnosis: Acute Anterior Myocardial Infarction  Secondary Diagnosis: Hyperlipidemia    Procedures:  Left Heart Catheterization with PCI using HUNTER to ostial LAD  Telemetry Monitoring    Brief Summary of Patient's Course: The patient is 59 y.o. male with a past medical history significant for prior tobacco abuse and COPD who presented to [facility] ED with chest pain. He was felt to have an acute anterior MI there and a STEMI was called. He had been at the gym exercising but started having chest pain when he left. He was sweating. On arrival, he states that he is still having burning in his chest but the chest pain is better. He relates that his stent was a year ago at [facility]. He relates that he has had some issues recently. In the cath lab he underwent IVUS assisted PCI to an 80% ulcerated proximal/ostial LAD lesion resulting in 0% residual stenosis and MAURILIO 3 flow. LVEF was preserved. Interval history:  Subhash Beck reports feeling well today. He denies any chest pain or tightness. He has had a little burning in his chest.  Sinus rhythm on telemetry. No events overnight. Vitals:    12/05/23 0812   BP: 142/89   Pulse: 74   Resp: 13   Temp:    SpO2: 96%     RR, normal S1/S2, no M/R/G  Lungs bilaterally CTA  Abd soft, NT ND  No edema  No focal neurologic deficits  2+ bilateral radial and femoral pulses  No right groin hematoma. Labs reviewed and within normal limits. We will discharge Subhash Beck to home today on dual antiplatelet therapy with aspirin and Brilinta. I did emphasize to him strict compliance with these medications and the schedule. He will be on high-dose statin therapy with rosuvastatin 40 mg daily for his hyperlipidemia.   He will not be on a beta-blocker due to his own request due to

## 2023-12-05 NOTE — PROGRESS NOTES
4 [facility] Skin Assessment     NAME:  Earnestine Segal  YOB: 1965  MEDICAL RECORD NUMBER:  [id number]    The patient is being assessed for  Admission    I agree that at least one RN has performed a thorough Head to Toe Skin Assessment on the patient. ALL assessment sites listed below have been assessed. Areas assessed by both nurses:    Head, Face, Ears, Shoulders, Back, Chest, Arms, Elbows, Hands, Sacrum. Buttock, Coccyx, Ischium, Legs. Feet and Heels, and Under Medical Devices         Does the Patient have a Wound? Yes wound(s) were present on assessment.  LDA wound assessment was Initiated and completed by RN       Edwin Prevention initiated by RN: Yes  Wound Care Orders initiated by RN: No    Pressure Injury (Stage 3,4, Unstageable, DTI, NWPT, and Complex wounds) if present, place Wound referral order by RN under : No    New Ostomies, if present place, Ostomy referral order under : No     Nurse 1 eSignature: Electronically signed by Gary Calderon RN on 12/4/23 at 7:03 PM EST    **SHARE this note so that the co-signing nurse can place an eSignature**    Nurse 2 eSignature: {Esignature:[id number]}

## 2023-12-05 NOTE — CARE COORDINATION
Chart Reviewed. Spoke with patient about three scripts that were electronically sent to [facility] in [place] where is where he wanted them. Call placed to [facility]Drew who reports they are ready and No Charge and no precert needed.   [place]     Case Management   [phone]    12/5/2023  9:50 AM

## 2023-12-11 ENCOUNTER — TELEPHONE (OUTPATIENT)
Dept: CARDIOLOGY CLINIC | Age: 58
End: 2023-12-11

## 2023-12-11 NOTE — TELEPHONE ENCOUNTER
Pt has some questions concerning the stent. He is curious about where the \"platelet\" went and will the collapse happen again. He has an appt with Griselda this week.    Lázaro # [phone]

## 2023-12-11 NOTE — TELEPHONE ENCOUNTER
Dr. Vivas, patient had very specific questions in regards to his angiogram that I was unable to answer. I advised that you will call him when you are able to - he also has an OV with GENE Villalobos on 12/14/23. Thank you.

## 2023-12-13 NOTE — PROGRESS NOTES
balloon  this with a 3.5-mm noncompliant balloon and stented with a 4-mm x 15-mm  Ankit [employer] drug-eluting stent dilated to 4.2 mm in diameter. There  is a jailed diagonal branch that was rescued with balloon angioplasty  restoring MAURILIO 3 flow. 2.  Low left ventricular end diastolic pressure at 4 mmHg. 3.  Normal left ventricular systolic function with LV ejection fraction  of 55%. 4.  No gradient across the aortic valve on pullback to suggest aortic  stenosis. Assessment:   Plan:    STEMI/CAD  PCI to prox LAD in stent restenosis  And balloon of jailed diag  No chest pain    On ASA, statin, brilinta   No beta-blocker secondary to baseline bradycardia  Risk factor modifications   Pt works out at [employer] on a regular basis   OK to slowly increase activity     Consider cardiac rehab at next OV   Echo to assess EF      2. HLD    On statn     Continue       Further evaluation will be based upon the patient's clinical course and testing results. All questions and concerns were addressed to the patient/family. Alternatives to my treatment were discussed.      Von Torres, APRN-CNP  [address] [facility]  Cardiology  12/14/2023  10:45 AM

## 2023-12-14 ENCOUNTER — OFFICE VISIT (OUTPATIENT)
Dept: CARDIOLOGY CLINIC | Age: 58
End: 2023-12-14
Payer: MEDICARE

## 2023-12-14 VITALS
OXYGEN SATURATION: 95 % | SYSTOLIC BLOOD PRESSURE: 140 MMHG | HEIGHT: 76 IN | HEART RATE: 59 BPM | BODY MASS INDEX: 28.74 KG/M2 | DIASTOLIC BLOOD PRESSURE: 88 MMHG | WEIGHT: 236 LBS

## 2023-12-14 DIAGNOSIS — I21.09 ACUTE ANTERIOR MYOCARDIAL INFARCTION (HCC): Primary | ICD-10-CM

## 2023-12-14 DIAGNOSIS — I21.3 ST ELEVATION MYOCARDIAL INFARCTION (STEMI), UNSPECIFIED ARTERY (HCC): ICD-10-CM

## 2023-12-14 DIAGNOSIS — E78.5 DYSLIPIDEMIA: ICD-10-CM

## 2023-12-14 PROCEDURE — 3077F SYST BP >= 140 MM HG: CPT | Performed by: NURSE PRACTITIONER

## 2023-12-14 PROCEDURE — 3079F DIAST BP 80-89 MM HG: CPT | Performed by: NURSE PRACTITIONER

## 2023-12-14 PROCEDURE — 99214 OFFICE O/P EST MOD 30 MIN: CPT | Performed by: NURSE PRACTITIONER

## 2023-12-14 PROCEDURE — 93000 ELECTROCARDIOGRAM COMPLETE: CPT | Performed by: NURSE PRACTITIONER

## 2024-01-08 ENCOUNTER — HOSPITAL ENCOUNTER (OUTPATIENT)
Age: 59
Setting detail: OBSERVATION
Discharge: HOME | DRG: 313 | End: 2024-01-09
Attending: EMERGENCY MEDICINE | Admitting: FAMILY MEDICINE
Payer: MEDICARE

## 2024-01-08 ENCOUNTER — APPOINTMENT (OUTPATIENT)
Dept: GENERAL RADIOLOGY | Age: 59
DRG: 313 | End: 2024-01-08
Payer: MEDICARE

## 2024-01-08 DIAGNOSIS — R07.9 CHEST PAIN, UNSPECIFIED TYPE: Primary | ICD-10-CM

## 2024-01-08 PROBLEM — I20.0 UNSTABLE ANGINA (HCC): Status: ACTIVE | Noted: 2024-01-08

## 2024-01-08 LAB
ALBUMIN SERPL-MCNC: 4.3 G/DL (ref 3.4–5)
ALBUMIN/GLOB SERPL: 1.7 {RATIO} (ref 1.1–2.2)
ALP SERPL-CCNC: 68 U/L (ref 40–129)
ALT SERPL-CCNC: 30 U/L (ref 10–40)
ANION GAP SERPL CALCULATED.3IONS-SCNC: 10 MMOL/L (ref 3–16)
AST SERPL-CCNC: 23 U/L (ref 15–37)
BASOPHILS # BLD: 0 K/UL (ref 0–0.2)
BASOPHILS NFR BLD: 0.3 %
BILIRUB SERPL-MCNC: 0.6 MG/DL (ref 0–1)
BUN SERPL-MCNC: 17 MG/DL (ref 7–20)
CALCIUM SERPL-MCNC: 9.7 MG/DL (ref 8.3–10.6)
CHLORIDE SERPL-SCNC: 102 MMOL/L (ref 99–110)
CO2 SERPL-SCNC: 25 MMOL/L (ref 21–32)
CREAT SERPL-MCNC: 1.2 MG/DL (ref 0.8–1.3)
DEPRECATED RDW RBC AUTO: 11.9 % (ref 12.4–15.4)
EOSINOPHIL # BLD: 0.2 K/UL (ref 0–0.6)
EOSINOPHIL NFR BLD: 3.8 %
GFR SERPLBLD CREATININE-BSD FMLA CKD-EPI: >60 ML/MIN/{1.73_M2}
GLUCOSE SERPL-MCNC: 106 MG/DL (ref 70–99)
HCT VFR BLD AUTO: 48.3 % (ref 40.5–52.5)
HGB BLD-MCNC: 16.8 G/DL (ref 13.5–17.5)
IMM GRANULOCYTES # BLD: 0 K/UL (ref 0–0.2)
IMM GRANULOCYTES NFR BLD: 0.2 %
LYMPHOCYTES # BLD: 1.2 K/UL (ref 1–5.1)
LYMPHOCYTES NFR BLD: 19.3 %
MCH RBC QN AUTO: 32.3 PG (ref 26–34)
MCHC RBC AUTO-ENTMCNC: 34.8 G/DL (ref 32–36.4)
MCV RBC AUTO: 92.9 FL (ref 80–100)
MONOCYTES # BLD: 0.7 K/UL (ref 0–1.3)
MONOCYTES NFR BLD: 11 %
NEUTROPHILS # BLD: 4.2 K/UL (ref 1.7–7.7)
NEUTROPHILS NFR BLD: 65.4 %
NT-PROBNP SERPL-MCNC: 82 PG/ML (ref 0–124)
PLATELET # BLD AUTO: 188 K/UL (ref 135–450)
PMV BLD AUTO: 8.6 FL (ref 5–10.5)
POTASSIUM SERPL-SCNC: 3.9 MMOL/L (ref 3.5–5.1)
PROT SERPL-MCNC: 6.8 G/DL (ref 6.4–8.2)
RBC # BLD AUTO: 5.2 M/UL (ref 4.2–5.9)
SODIUM SERPL-SCNC: 137 MMOL/L (ref 136–145)
TROPONIN, HIGH SENSITIVITY: 10 NG/L (ref 0–22)
TROPONIN, HIGH SENSITIVITY: 10 NG/L (ref 0–22)
WBC # BLD AUTO: 6.4 K/UL (ref 4–11)

## 2024-01-08 PROCEDURE — G0378 HOSPITAL OBSERVATION PER HR: HCPCS

## 2024-01-08 PROCEDURE — 85025 COMPLETE CBC W/AUTO DIFF WBC: CPT

## 2024-01-08 PROCEDURE — 71045 X-RAY EXAM CHEST 1 VIEW: CPT

## 2024-01-08 PROCEDURE — 83880 ASSAY OF NATRIURETIC PEPTIDE: CPT

## 2024-01-08 PROCEDURE — 84484 ASSAY OF TROPONIN QUANT: CPT

## 2024-01-08 PROCEDURE — 80053 COMPREHEN METABOLIC PANEL: CPT

## 2024-01-08 PROCEDURE — 6370000000 HC RX 637 (ALT 250 FOR IP): Performed by: EMERGENCY MEDICINE

## 2024-01-08 PROCEDURE — 99285 EMERGENCY DEPT VISIT HI MDM: CPT

## 2024-01-08 PROCEDURE — 36415 COLL VENOUS BLD VENIPUNCTURE: CPT

## 2024-01-08 PROCEDURE — 93005 ELECTROCARDIOGRAM TRACING: CPT | Performed by: EMERGENCY MEDICINE

## 2024-01-08 RX ORDER — NITROGLYCERIN 0.4 MG/1
0.4 TABLET SUBLINGUAL EVERY 5 MIN PRN
Status: DISCONTINUED | OUTPATIENT
Start: 2024-01-08 | End: 2024-01-09 | Stop reason: DRUGHIGH

## 2024-01-08 RX ORDER — ASPIRIN 81 MG/1
324 TABLET, CHEWABLE ORAL ONCE
Status: COMPLETED | OUTPATIENT
Start: 2024-01-08 | End: 2024-01-08

## 2024-01-08 RX ADMIN — NITROGLYCERIN 0.4 MG: 0.4 TABLET, ORALLY DISINTEGRATING SUBLINGUAL at 18:49

## 2024-01-08 RX ADMIN — ASPIRIN 324 MG: 81 TABLET, CHEWABLE ORAL at 20:54

## 2024-01-08 RX ADMIN — NITROGLYCERIN 0.4 MG: 0.4 TABLET, ORALLY DISINTEGRATING SUBLINGUAL at 18:08

## 2024-01-08 RX ADMIN — NITROGLYCERIN 0.5 INCH: 20 OINTMENT TOPICAL at 18:58

## 2024-01-08 ASSESSMENT — PAIN SCALES - GENERAL
PAINLEVEL_OUTOF10: 2
PAINLEVEL_OUTOF10: 3
PAINLEVEL_OUTOF10: 0
PAINLEVEL_OUTOF10: 0
PAINLEVEL_OUTOF10: 3
PAINLEVEL_OUTOF10: 0

## 2024-01-08 ASSESSMENT — PAIN - FUNCTIONAL ASSESSMENT
PAIN_FUNCTIONAL_ASSESSMENT: 0-10
PAIN_FUNCTIONAL_ASSESSMENT: PREVENTS OR INTERFERES SOME ACTIVE ACTIVITIES AND ADLS

## 2024-01-08 ASSESSMENT — PAIN DESCRIPTION - PAIN TYPE
TYPE: ACUTE PAIN
TYPE: ACUTE PAIN

## 2024-01-08 ASSESSMENT — PAIN DESCRIPTION - ORIENTATION
ORIENTATION: LEFT
ORIENTATION: LEFT

## 2024-01-08 ASSESSMENT — PAIN DESCRIPTION - LOCATION
LOCATION: CHEST

## 2024-01-08 ASSESSMENT — PAIN DESCRIPTION - FREQUENCY
FREQUENCY: CONTINUOUS
FREQUENCY: INTERMITTENT

## 2024-01-08 ASSESSMENT — LIFESTYLE VARIABLES: HOW OFTEN DO YOU HAVE A DRINK CONTAINING ALCOHOL: NEVER

## 2024-01-08 ASSESSMENT — PAIN DESCRIPTION - DESCRIPTORS
DESCRIPTORS: ACHING;HEAVINESS
DESCRIPTORS: ACHING;HEAVINESS

## 2024-01-08 NOTE — TELEPHONE ENCOUNTER
Wife Noelle phoned and said her  was complaining of chest pain and shortness of breath and very fatigued.  Noelle stated they were scared it was a heart attack and they were driving already and wanted to go to the ER.  Patient was advised to go to the ER if those were his symptoms.

## 2024-01-08 NOTE — ED NOTES
Patient states he has no pain only a burning sensation.  States he has had the burning since his stent placement.

## 2024-01-08 NOTE — ED TRIAGE NOTES
Has had constant left sided chest pain since yesterday.  Recent MI in December.  Was hospitalized at [place].

## 2024-01-08 NOTE — ED PROVIDER NOTES
[facility]  eMERGENCY dEPARTMENT eNCOUnter      Pt Name: Carlos Ricks  MRN: [id number]  Birthdate 1965  Date of evaluation: 1/8/2024  Provider: LEONARDO MARROQUIN MD    CHIEF COMPLAINT       Chief Complaint   Patient presents with    Chest Pain     Has had constant left sided chest pain since yesterday.  Recent MI in December.  Was hospitalized at [facility].          CRITICAL CARE TIME   Total Critical Care time was 0 minutes, excluding separately reportable procedures.  There was a high probability of clinically significant/life threatening deterioration in the patient's condition which required my urgent intervention.        HISTORY OF PRESENT ILLNESS  (Location/Symptom, Timing/Onset, Context/Setting, Quality, Duration, Modifying Factors, Severity.)   History From: Patient  Limitations to history : None    Carlos Ricks is a 58 y.o. male who presents to the emergency department complaining of left-sided chest pain that started yesterday.  This patient has a history of coronary artery disease.  He is status post stent placement in his LAD on 12/4/2023.  He states since his stent was placed a month ago he is a little bit of burning discomfort on and off.  He states since yesterday he has had this aching pain in his left anterior chest.  He feels short of breath.  No nausea.  No diaphoresis.  He says he has chronic neck and shoulder pain and that is no different than usual.  He has a previous history of a stent placed at [facility] about a year ago.      Nursing Notes were reviewed and I agree.      SCREENINGS                         CIWA Assessment  BP: 128/78  Pulse: 66           REVIEW OF SYSTEMS    (2-9 systems for level 4, 10 or more for level 5)     General: No fever or chills.  HEENT: No earache rhinorrhea or sore throat.  Cardiovascular: Aching left-sided chest discomfort.  Nonradiating.  Pulmonary: Shortness of breath.  GI: No abdominal pain nausea or vomiting.  Skin:  completed with a voice recognition program.  Efforts were made to edit the dictations but occasionally words are mis-transcribed.)    LUI MARROQUIN MD  Attending Emergency Physician        Lui Marroquin MD  01/08/24 2103       Lui Marroquin MD  01/08/24 2113

## 2024-01-08 NOTE — TELEPHONE ENCOUNTER
Noelle, Lázaro's wife called the office with concern. She shared that Lázaro is suffering with SOB and reoccurring chest pain. She states that earlier today, he bent over to tie his laces and immediately experienced exhaustion and SOB. She shared that they believe this has occurred post stent placement and grew worse about 1 week ago.     I asked if Lázaro was within earshot to discuss his symptoms, Noelle responded that he was doing a light workout outside.     Before disconnecting the call, Noelle shared that he recently scrapped his big toe and he had consistent bleeding; took multiple bandages, gauze and finally super glue to stop the bleeding.      Please advise.    Noelle's callback: [phone]

## 2024-01-09 ENCOUNTER — TELEPHONE (OUTPATIENT)
Dept: CARDIOLOGY CLINIC | Age: 59
End: 2024-01-09

## 2024-01-09 VITALS
SYSTOLIC BLOOD PRESSURE: 113 MMHG | TEMPERATURE: 98.2 F | HEIGHT: 76 IN | DIASTOLIC BLOOD PRESSURE: 72 MMHG | RESPIRATION RATE: 14 BRPM | OXYGEN SATURATION: 95 % | WEIGHT: 225.53 LBS | HEART RATE: 76 BPM | BODY MASS INDEX: 27.46 KG/M2

## 2024-01-09 PROBLEM — G47.33 OSA (OBSTRUCTIVE SLEEP APNEA): Status: ACTIVE | Noted: 2024-01-09

## 2024-01-09 PROBLEM — Z79.899 POLYPHARMACY: Status: ACTIVE | Noted: 2024-01-09

## 2024-01-09 LAB
EKG ATRIAL RATE: 80 BPM
EKG DIAGNOSIS: NORMAL
EKG P AXIS: 60 DEGREES
EKG P-R INTERVAL: 174 MS
EKG Q-T INTERVAL: 290 MS
EKG QRS DURATION: 104 MS
EKG QTC CALCULATION (BAZETT): 334 MS
EKG R AXIS: -13 DEGREES
EKG T AXIS: 50 DEGREES
EKG VENTRICULAR RATE: 80 BPM

## 2024-01-09 PROCEDURE — 94660 CPAP INITIATION&MGMT: CPT

## 2024-01-09 PROCEDURE — 93010 ELECTROCARDIOGRAM REPORT: CPT | Performed by: INTERNAL MEDICINE

## 2024-01-09 PROCEDURE — 94760 N-INVAS EAR/PLS OXIMETRY 1: CPT

## 2024-01-09 PROCEDURE — G0378 HOSPITAL OBSERVATION PER HR: HCPCS

## 2024-01-09 PROCEDURE — 3430000000 HC RX DIAGNOSTIC RADIOPHARMACEUTICAL: Performed by: INTERNAL MEDICINE

## 2024-01-09 PROCEDURE — 1200000000 HC SEMI PRIVATE

## 2024-01-09 PROCEDURE — 99223 1ST HOSP IP/OBS HIGH 75: CPT | Performed by: STUDENT IN AN ORGANIZED HEALTH CARE EDUCATION/TRAINING PROGRAM

## 2024-01-09 PROCEDURE — 78452 HT MUSCLE IMAGE SPECT MULT: CPT

## 2024-01-09 PROCEDURE — A9502 TC99M TETROFOSMIN: HCPCS | Performed by: INTERNAL MEDICINE

## 2024-01-09 PROCEDURE — 6370000000 HC RX 637 (ALT 250 FOR IP)

## 2024-01-09 PROCEDURE — 93017 CV STRESS TEST TRACING ONLY: CPT

## 2024-01-09 PROCEDURE — 6370000000 HC RX 637 (ALT 250 FOR IP): Performed by: FAMILY MEDICINE

## 2024-01-09 PROCEDURE — 6360000002 HC RX W HCPCS: Performed by: INTERNAL MEDICINE

## 2024-01-09 PROCEDURE — 93306 TTE W/DOPPLER COMPLETE: CPT

## 2024-01-09 RX ORDER — SODIUM CHLORIDE 0.9 % (FLUSH) 0.9 %
5-40 SYRINGE (ML) INJECTION PRN
Status: DISCONTINUED | OUTPATIENT
Start: 2024-01-09 | End: 2024-01-09 | Stop reason: HOSPADM

## 2024-01-09 RX ORDER — POLYETHYLENE GLYCOL 3350 17 G/17G
17 POWDER, FOR SOLUTION ORAL DAILY PRN
Status: DISCONTINUED | OUTPATIENT
Start: 2024-01-09 | End: 2024-01-09 | Stop reason: HOSPADM

## 2024-01-09 RX ORDER — ROSUVASTATIN CALCIUM 20 MG/1
20 TABLET, COATED ORAL NIGHTLY
Status: DISCONTINUED | OUTPATIENT
Start: 2024-01-09 | End: 2024-01-09 | Stop reason: HOSPADM

## 2024-01-09 RX ORDER — MAGNESIUM SULFATE IN WATER 40 MG/ML
2000 INJECTION, SOLUTION INTRAVENOUS PRN
Status: DISCONTINUED | OUTPATIENT
Start: 2024-01-09 | End: 2024-01-09 | Stop reason: HOSPADM

## 2024-01-09 RX ORDER — DEXTROAMPHETAMINE SACCHARATE, AMPHETAMINE ASPARTATE, DEXTROAMPHETAMINE SULFATE AND AMPHETAMINE SULFATE 2.5; 2.5; 2.5; 2.5 MG/1; MG/1; MG/1; MG/1
10 TABLET ORAL DAILY
Status: DISCONTINUED | OUTPATIENT
Start: 2024-01-09 | End: 2024-01-09 | Stop reason: DRUGHIGH

## 2024-01-09 RX ORDER — REGADENOSON 0.08 MG/ML
0.4 INJECTION, SOLUTION INTRAVENOUS
Status: COMPLETED | OUTPATIENT
Start: 2024-01-09 | End: 2024-01-09

## 2024-01-09 RX ORDER — ASPIRIN 81 MG/1
81 TABLET, CHEWABLE ORAL DAILY
Status: DISCONTINUED | OUTPATIENT
Start: 2024-01-09 | End: 2024-01-09 | Stop reason: HOSPADM

## 2024-01-09 RX ORDER — ONDANSETRON 4 MG/1
4 TABLET, ORALLY DISINTEGRATING ORAL EVERY 8 HOURS PRN
Status: DISCONTINUED | OUTPATIENT
Start: 2024-01-09 | End: 2024-01-09 | Stop reason: HOSPADM

## 2024-01-09 RX ORDER — RANOLAZINE 500 MG/1
500 TABLET, EXTENDED RELEASE ORAL 2 TIMES DAILY
Status: DISCONTINUED | OUTPATIENT
Start: 2024-01-09 | End: 2024-01-09 | Stop reason: HOSPADM

## 2024-01-09 RX ORDER — MONTELUKAST SODIUM 10 MG/1
10 TABLET ORAL NIGHTLY
Status: DISCONTINUED | OUTPATIENT
Start: 2024-01-09 | End: 2024-01-09 | Stop reason: HOSPADM

## 2024-01-09 RX ORDER — POTASSIUM CHLORIDE 7.45 MG/ML
10 INJECTION INTRAVENOUS PRN
Status: DISCONTINUED | OUTPATIENT
Start: 2024-01-09 | End: 2024-01-09 | Stop reason: HOSPADM

## 2024-01-09 RX ORDER — SODIUM CHLORIDE 9 MG/ML
INJECTION, SOLUTION INTRAVENOUS CONTINUOUS
Status: ACTIVE | OUTPATIENT
Start: 2024-01-09 | End: 2024-01-09

## 2024-01-09 RX ORDER — DEXTROAMPHETAMINE SACCHARATE, AMPHETAMINE ASPARTATE, DEXTROAMPHETAMINE SULFATE AND AMPHETAMINE SULFATE 5; 5; 5; 5 MG/1; MG/1; MG/1; MG/1
20 TABLET ORAL 2 TIMES DAILY
Status: DISCONTINUED | OUTPATIENT
Start: 2024-01-09 | End: 2024-01-09 | Stop reason: ALTCHOICE

## 2024-01-09 RX ORDER — OXYCODONE HYDROCHLORIDE AND ACETAMINOPHEN 5; 325 MG/1; MG/1
1 TABLET ORAL EVERY 8 HOURS PRN
Status: DISCONTINUED | OUTPATIENT
Start: 2024-01-09 | End: 2024-01-09 | Stop reason: HOSPADM

## 2024-01-09 RX ORDER — ACETAMINOPHEN 650 MG/1
650 SUPPOSITORY RECTAL EVERY 6 HOURS PRN
Status: DISCONTINUED | OUTPATIENT
Start: 2024-01-09 | End: 2024-01-09 | Stop reason: HOSPADM

## 2024-01-09 RX ORDER — DEXTROAMPHETAMINE SACCHARATE, AMPHETAMINE ASPARTATE, DEXTROAMPHETAMINE SULFATE AND AMPHETAMINE SULFATE 2.5; 2.5; 2.5; 2.5 MG/1; MG/1; MG/1; MG/1
20 TABLET ORAL DAILY
Status: DISCONTINUED | OUTPATIENT
Start: 2024-01-09 | End: 2024-01-09 | Stop reason: HOSPADM

## 2024-01-09 RX ORDER — ACYCLOVIR 800 MG/1
800 TABLET ORAL DAILY
COMMUNITY

## 2024-01-09 RX ORDER — AMLODIPINE BESYLATE 5 MG/1
2.5 TABLET ORAL DAILY
Status: DISCONTINUED | OUTPATIENT
Start: 2024-01-10 | End: 2024-01-09 | Stop reason: HOSPADM

## 2024-01-09 RX ORDER — ENOXAPARIN SODIUM 100 MG/ML
30 INJECTION SUBCUTANEOUS 2 TIMES DAILY
Status: DISCONTINUED | OUTPATIENT
Start: 2024-01-09 | End: 2024-01-09 | Stop reason: HOSPADM

## 2024-01-09 RX ORDER — RANOLAZINE 500 MG/1
500 TABLET, EXTENDED RELEASE ORAL 2 TIMES DAILY
Qty: 60 TABLET | Refills: 3 | Status: SHIPPED | OUTPATIENT
Start: 2024-01-09

## 2024-01-09 RX ORDER — SODIUM CHLORIDE 0.9 % (FLUSH) 0.9 %
5-40 SYRINGE (ML) INJECTION EVERY 12 HOURS SCHEDULED
Status: DISCONTINUED | OUTPATIENT
Start: 2024-01-09 | End: 2024-01-09 | Stop reason: HOSPADM

## 2024-01-09 RX ORDER — ACYCLOVIR 800 MG/1
800 TABLET ORAL DAILY
Status: DISCONTINUED | OUTPATIENT
Start: 2024-01-09 | End: 2024-01-09 | Stop reason: HOSPADM

## 2024-01-09 RX ORDER — ZOLPIDEM TARTRATE 5 MG/1
10 TABLET ORAL NIGHTLY PRN
Status: DISCONTINUED | OUTPATIENT
Start: 2024-01-09 | End: 2024-01-09 | Stop reason: HOSPADM

## 2024-01-09 RX ORDER — AMLODIPINE BESYLATE 2.5 MG/1
2.5 TABLET ORAL DAILY
COMMUNITY

## 2024-01-09 RX ORDER — DEXTROAMPHETAMINE SACCHARATE, AMPHETAMINE ASPARTATE, DEXTROAMPHETAMINE SULFATE AND AMPHETAMINE SULFATE 2.5; 2.5; 2.5; 2.5 MG/1; MG/1; MG/1; MG/1
10 TABLET ORAL DAILY
COMMUNITY

## 2024-01-09 RX ORDER — DEXTROAMPHETAMINE SACCHARATE, AMPHETAMINE ASPARTATE, DEXTROAMPHETAMINE SULFATE AND AMPHETAMINE SULFATE 2.5; 2.5; 2.5; 2.5 MG/1; MG/1; MG/1; MG/1
30 TABLET ORAL DAILY
Status: DISCONTINUED | OUTPATIENT
Start: 2024-01-10 | End: 2024-01-09 | Stop reason: HOSPADM

## 2024-01-09 RX ORDER — ONDANSETRON 2 MG/ML
4 INJECTION INTRAMUSCULAR; INTRAVENOUS EVERY 6 HOURS PRN
Status: DISCONTINUED | OUTPATIENT
Start: 2024-01-09 | End: 2024-01-09 | Stop reason: HOSPADM

## 2024-01-09 RX ORDER — ACETAMINOPHEN 325 MG/1
650 TABLET ORAL EVERY 6 HOURS PRN
Status: DISCONTINUED | OUTPATIENT
Start: 2024-01-09 | End: 2024-01-09 | Stop reason: HOSPADM

## 2024-01-09 RX ORDER — BUDESONIDE, GLYCOPYRROLATE, AND FORMOTEROL FUMARATE 160; 9; 4.8 UG/1; UG/1; UG/1
2 AEROSOL, METERED RESPIRATORY (INHALATION)
COMMUNITY

## 2024-01-09 RX ORDER — SENNOSIDES A AND B 8.6 MG/1
1 TABLET, FILM COATED ORAL NIGHTLY
Status: DISCONTINUED | OUTPATIENT
Start: 2024-01-09 | End: 2024-01-09 | Stop reason: HOSPADM

## 2024-01-09 RX ORDER — OXYCODONE HYDROCHLORIDE AND ACETAMINOPHEN 5; 325 MG/1; MG/1
1 TABLET ORAL EVERY 8 HOURS PRN
COMMUNITY

## 2024-01-09 RX ORDER — SODIUM CHLORIDE 9 MG/ML
INJECTION, SOLUTION INTRAVENOUS PRN
Status: DISCONTINUED | OUTPATIENT
Start: 2024-01-09 | End: 2024-01-09 | Stop reason: HOSPADM

## 2024-01-09 RX ORDER — TAMSULOSIN HYDROCHLORIDE 0.4 MG/1
0.4 CAPSULE ORAL DAILY
Status: DISCONTINUED | OUTPATIENT
Start: 2024-01-09 | End: 2024-01-09 | Stop reason: HOSPADM

## 2024-01-09 RX ORDER — NITROGLYCERIN 0.4 MG/1
0.4 TABLET SUBLINGUAL EVERY 5 MIN PRN
Status: DISCONTINUED | OUTPATIENT
Start: 2024-01-09 | End: 2024-01-09 | Stop reason: HOSPADM

## 2024-01-09 RX ORDER — POTASSIUM CHLORIDE 20 MEQ/1
40 TABLET, EXTENDED RELEASE ORAL PRN
Status: DISCONTINUED | OUTPATIENT
Start: 2024-01-09 | End: 2024-01-09 | Stop reason: HOSPADM

## 2024-01-09 RX ORDER — MAGNESIUM HYDROXIDE/ALUMINUM HYDROXICE/SIMETHICONE 120; 1200; 1200 MG/30ML; MG/30ML; MG/30ML
30 SUSPENSION ORAL EVERY 6 HOURS PRN
Status: DISCONTINUED | OUTPATIENT
Start: 2024-01-09 | End: 2024-01-09 | Stop reason: HOSPADM

## 2024-01-09 RX ADMIN — TAMSULOSIN HYDROCHLORIDE 0.4 MG: 0.4 CAPSULE ORAL at 13:56

## 2024-01-09 RX ADMIN — ASPIRIN 81 MG: 81 TABLET, CHEWABLE ORAL at 13:56

## 2024-01-09 RX ADMIN — DEXTROAMPHETAMINE SACCHARATE, AMPHETAMINE ASPARTATE, DEXTROAMPHETAMINE SULFATE, AMPHETAMINE SULFATE TABLETS, 10 MG,CLL 20 MG: 2.5; 2.5; 2.5; 2.5 TABLET ORAL at 13:55

## 2024-01-09 RX ADMIN — NITROGLYCERIN 0.5 INCH: 20 OINTMENT TOPICAL at 13:59

## 2024-01-09 RX ADMIN — TICAGRELOR 90 MG: 90 TABLET ORAL at 13:57

## 2024-01-09 RX ADMIN — REGADENOSON 0.4 MG: 0.08 INJECTION, SOLUTION INTRAVENOUS at 10:43

## 2024-01-09 RX ADMIN — ACYCLOVIR 800 MG: 800 TABLET ORAL at 14:24

## 2024-01-09 RX ADMIN — TETROFOSMIN 30 MILLICURIE: 1.38 INJECTION, POWDER, LYOPHILIZED, FOR SOLUTION INTRAVENOUS at 10:45

## 2024-01-09 RX ADMIN — TETROFOSMIN 10 MILLICURIE: 1.38 INJECTION, POWDER, LYOPHILIZED, FOR SOLUTION INTRAVENOUS at 09:11

## 2024-01-09 ASSESSMENT — ENCOUNTER SYMPTOMS
VOMITING: 0
WHEEZING: 0
COUGH: 0
NAUSEA: 0
SHORTNESS OF BREATH: 1

## 2024-01-09 ASSESSMENT — PAIN - FUNCTIONAL ASSESSMENT: PAIN_FUNCTIONAL_ASSESSMENT: NONE - DENIES PAIN

## 2024-01-09 ASSESSMENT — PAIN SCALES - GENERAL
PAINLEVEL_OUTOF10: 0

## 2024-01-09 NOTE — DISCHARGE SUMMARY
Hospital Medicine Discharge Summary    Patient ID: Carlos Ricks      Patient's PCP: Matteo Johnston Jr., MD    Admit Date: 1/8/2024     Discharge Date:   1/9/24    Admitting Physician: Agustín Soni MD     Discharge Physician: Holley Weller PA-C     Discharge Diagnoses  Chest pain  Coronary artery disease      Hospital Course: Patient presented for chest pain, had recent stent placed to the LAD on 12/4/2023.  Troponins were negative x 2, EKG with no ST elevation.  Patient reported compliance with aspirin, Brilinta and statin.  Chest pain did improve with nitroglycerin x 2.  Cardiology was consulted, patient underwent negative stress test 1/9/2024. Cardiology reccommended no further ischemic workup, renexa was added for angina symptoms. Cleared to d/c per cardiology.     Chest pain  Coronary artery disease  -Recent stent placed 12/4/2023.  -Troponin negative x 2, EKG with no ST elevation.  Nitro responsive  -Negative stress test completed 1/9/2024, cardiology following, reccommended no further ischemic workup, renexa added. Signed off and cleared for d/c  -Continue home Brilinta, aspirin, statin  -Monitor on telemetry      Patient stated they felt well and wanted to go home.  Patient denied chest pain, shortness of breath, palpitations, abdominal pain, nausea vomiting, diarrhea, dysuria, headache lightheadedness or dizziness.  Appetite good.  Voiding without difficulty.  Reviewed plan of care with patient, verbalized understanding and agreement.  Denied further questions or needs.      Physical Exam Performed:     /72   Pulse 76   Temp 98.2 °F (36.8 °C) (Oral)   Resp 14   Ht 1.93 m (6' 3.98\")   Wt 102.3 kg (225 lb 8.5 oz)   SpO2 95%   BMI 27.46 kg/m²       General appearance:  No apparent distress, appears stated age and cooperative.  HEENT:  Normal cephalic, atraumatic without obvious deformity. Extra ocular muscles intact. Conjunctivae/corneas clear.  Neck: Supple, with full range of  accuracy; however, inadvertent computerized transcription errors may be present.

## 2024-01-09 NOTE — PLAN OF CARE
Problem: Discharge Planning  Goal: Discharge to home or other facility with appropriate resources  1/9/2024 1012 by Irene Saxena RN  Outcome: Progressing  1/9/2024 0639 by Charity Polanco RN  Outcome: Progressing     Problem: Safety - Adult  Goal: Free from fall injury  1/9/2024 1012 by Irene Saxena RN  Outcome: Progressing  1/9/2024 0639 by Charity Polanco RN  Outcome: Progressing     Problem: Cardiovascular - Adult  Goal: Maintains optimal cardiac output and hemodynamic stability  1/9/2024 1012 by Irene Saxena RN  Outcome: Progressing  1/9/2024 0639 by Charity Polanco RN  Outcome: Progressing  Goal: Absence of cardiac dysrhythmias or at baseline  1/9/2024 1012 by Irene Saxena RN  Outcome: Progressing  1/9/2024 0639 by Charity Polanco RN  Outcome: Progressing

## 2024-01-09 NOTE — ED NOTES
This RN spoke with RN House Supervisor at [facility] who states she is assigning Pt a bed at this time.

## 2024-01-09 NOTE — PROGRESS NOTES
4 Eyes Skin Assessment     NAME:  Carlos Ricks  YOB: 1965  MEDICAL RECORD NUMBER:  [id number]    The patient is being assessed for  Admission    I agree that at least one RN has performed a thorough Head to Toe Skin Assessment on the patient. ALL assessment sites listed below have been assessed.      Areas assessed by both nurses:    Head, Face, Ears, Shoulders, Back, Chest, Arms, Elbows, Hands, Sacrum. Buttock, Coccyx, Ischium, Legs. Feet and Heels, and Under Medical Devices         Does the Patient have a Wound? No noted wound(s)       Edwin Prevention initiated by RN: No  Wound Care Orders initiated by RN: No    Pressure Injury (Stage 3,4, Unstageable, DTI, NWPT, and Complex wounds) if present, place Wound referral order by RN under : No    New Ostomies, if present place, Ostomy referral order under : No     Nurse 1 eSignature: Electronically signed by Charity Polanco RN on 1/9/24 at 6:03 AM EST    **SHARE this note so that the co-signing nurse can place an eSignature**    Nurse 2 eSignature: Electronically signed by Jeffery Castillo RN on 1/9/24 at 6:04 AM EST

## 2024-01-09 NOTE — ASSESSMENT & PLAN NOTE
Relieved by nitro.  Recent stent placement.  Has been adherent to aspirin, Brilinta, and Crestor.  Continue in-house.  Cardiology team consulted prior to admission.  Appreciate their input and care.  Thus far EKG and troponin levels have been reassuring.  Currently chest pain-free upon arrival to [place].  Monitor on telemetry.  Repeat troponin level in the morning.

## 2024-01-09 NOTE — H&P
Hospitalist History and Physical        PCP: Matteo Johnston Jr., MD    Date of Admission:  Patient seen and examined on night of admission 01/09/24      Anticipate less than 2 midnights hospitalization will be needed to appropriately care for the patient's presenting issues.    Anticipated Discharge Location:  home    Assessment/Plan:      Principal Problem:    Unstable angina (HCC)  Active Problems:    Polypharmacy    LYLE (obstructive sleep apnea)  Resolved Problems:    * No resolved hospital problems. *     Unstable angina (HCC)       Relieved by nitro.  Recent stent placement.  Has been adherent to aspirin, Brilinta, and Crestor.  Continue in-house.  Cardiology team consulted prior to admission.  Appreciate their input and care.  Thus far EKG and troponin levels have been reassuring.  Currently chest pain-free upon arrival to [place].  Monitor on telemetry.  Repeat troponin level in the morning.    Polypharmacy       Patient on amphetamines, opiates, testosterone, and Ambien.  Not sure if this is a ideal long-term medication regiment.  Recommend follow-up with PCP to discuss in more depth risks and benefits of continuing.    LYLE (obstructive sleep apnea)       CPAP at night.              See above for problem focused plan.      All other systems appear either stable or near patient's baseline at time of admission. Continue to monitor clinical status closely overnight and adjust plan accordingly.  Continue home medications overnight as ordered for now and adjust medications as needed throughout hospital stay.  Any urgent/emergent consult for the night has been called by either myself or the ED team.  I will be available overnight for any urgent needs until daytime hospitalist team assumes primary attending responsibility at 7 AM.    Reviewed and interpreted: Details related to the patient's presentation that were available at time of admission EMR including but not limited to prior notes, medications, imaging,

## 2024-01-09 NOTE — TELEPHONE ENCOUNTER
Irene from [address] called to inform Ortega of stress test results of Carlos. States they look normal but would like to know if they are able to put in orders for dye.     Irene can be reached at [phone].

## 2024-01-09 NOTE — PROGRESS NOTES
Same-day progress note.  Patient admitted after midnight, earlier this a.m.  H&P reviewed.  Patient presented for chest pain, had recent stent placed to the LAD on 12/4/2023.  Troponins were negative x 2, EKG with no ST elevation.  Patient reported compliance with aspirin, Brilinta and statin.  Chest pain did improve with nitroglycerin x 2.  Cardiology was consulted, patient underwent negative stress test 1/9/2024.  Awaiting further recommendations per cardiology.    Chest pain  Coronary artery disease  -Recent stent placed 12/4/2023.  -Troponin negative x 2, EKG with no ST elevation.  Nitro responsive  -Negative stress test completed 1/9/2024, cardiology following, appreciate further recommendations  -Continue home Brilinta, aspirin, statin  -Monitor on telemetry

## 2024-01-09 NOTE — ED NOTES
Pt independently ambulated to the restroom with a smooth and steady gate, breathing equal and easy on room air. Pt states no pain. Pt's significant other at his side. Pt returned to bed and placed back on bedside monitor. All known needs met.

## 2024-01-09 NOTE — ED NOTES
Pt has his personal from home LYLE breathing machine at bedside that he is using at this time. Pt is able to maintain and operate it by himself. Pt label sticker placed on top of machine.

## 2024-01-09 NOTE — PLAN OF CARE
Problem: Discharge Planning  Goal: Discharge to home or other facility with appropriate resources  1/9/2024 1751 by Irene Saxena RN  Outcome: Completed  1/9/2024 1751 by Irene Saxena RN  Outcome: Progressing  1/9/2024 1012 by Irene Saxena RN  Outcome: Progressing  1/9/2024 0639 by Charity Polanco RN  Outcome: Progressing     Problem: Safety - Adult  Goal: Free from fall injury  1/9/2024 1751 by Irene Saxena RN  Outcome: Completed  1/9/2024 1751 by Irene Saxena RN  Outcome: Progressing  1/9/2024 1012 by Irene Saxena RN  Outcome: Progressing  1/9/2024 0639 by Charity Polanco RN  Outcome: Progressing     Problem: Cardiovascular - Adult  Goal: Maintains optimal cardiac output and hemodynamic stability  1/9/2024 1751 by Irene Saxena RN  Outcome: Completed  1/9/2024 1751 by Irene Saxena RN  Outcome: Progressing  1/9/2024 1012 by Irene Saxena RN  Outcome: Progressing  1/9/2024 0639 by Charity Polanco RN  Outcome: Progressing  Goal: Absence of cardiac dysrhythmias or at baseline  1/9/2024 1751 by Irene Saxena RN  Outcome: Completed  1/9/2024 1751 by Irene Saxena RN  Outcome: Progressing  1/9/2024 1012 by Irene Saxena RN  Outcome: Progressing  1/9/2024 0639 by Charity Polanco RN  Outcome: Progressing

## 2024-01-09 NOTE — CONSULTS
[facility]   CONSULTATION        Chief Complaint   Patient presents with    Chest Pain     Has had constant left sided chest pain since yesterday.  Recent MI in December.  Was hospitalized at [place].             History of Present Illness:  Carlos Rciks is a 58 y.o. patient who presented to the hospital with complaints of chest pain. I have been asked to provide consultation regarding further management and testing.    Patient who presents with repeat chest pain, atypical with a burning sensation.  Had recent PCI for an ulcerated in stent lesion with diagonal branch rescue, EF normal.  Biomarkers negative on presentation.  EKG without acute ST-T changes    Past Medical History:   has a past medical history of CAD (coronary artery disease), COPD (chronic obstructive pulmonary disease) (HCC), Emphysema lung (HCC), GERD (gastroesophageal reflux disease), and MRSA (methicillin resistant staph aureus) culture positive.    Surgical History:   has a past surgical history that includes Rotator cuff repair (Bilateral) and hernia repair.     Social History:   reports that he has quit smoking. His smoking use included cigarettes. He has a 38.0 pack-year smoking history. He does not have any smokeless tobacco history on file. He reports current alcohol use. He reports that he does not use drugs.     Family History:  No family history of premature coronary artery disease, aortic disease, or valve disease.    Home Medications:  Were reviewed and are listed in nursing record. and/or listed below  Prior to Admission medications    Medication Sig Start Date End Date Taking? Authorizing Provider   amphetamine-dextroamphetamine (ADDERALL) 10 MG tablet Take 1 tablet by mouth daily. Takes with 20 mg to = 30 mg  Max Daily Amount: 10 mg   Yes ProviderAdrienne MD   amLODIPine (NORVASC) 2.5 MG tablet Take 1 tablet by mouth daily   Yes ProviderAdrienne MD   oxyCODONE-acetaminophen (PERCOCET) 5-325 MG per tablet Take

## 2024-01-09 NOTE — PROGRESS NOTES
Patient admitted to 4261 on stretcher. Patient alert and oriented X4. Patient up adlib. Patient denies having chest pain at this time. Denies shortness of breath. Patient oriented to room and call light. Tele monitor placed. Nonskid socks applied. Patients needs met at this time.

## 2024-01-09 NOTE — PROGRESS NOTES
Verbal and written discharge instructions were given to the patient. Patient verbalized understanding of d/c instructions including medication orders for home and possible side effects. Pt instructed and verbalizes understanding to call the doctor listed if they have any complications or worsening of symptoms and verbalizes understanding about follow up appointments. D/cd IV access, patient tolerated well, showed no signs of bleeding. Pt discharged to home with all belongings. Out via wheelchair.

## 2024-01-09 NOTE — TELEPHONE ENCOUNTER
Brendan's wife is calling wondering if she needs to cancel the Echo that is scheduled for tomorrow  1/10/24 in our [place] office.    Noelle states Dr. Vivas was going to go see Brendan when he was done with his clinic today and can let Carlos know about the Echo.

## 2024-01-09 NOTE — ED NOTES
This RN placed a request for transport to [facility] using [facility]. ALS. No iso. Cardiac monitor. NSL PIV. Awaiting Company and ETA.

## 2024-01-09 NOTE — ED NOTES
Transfer of care Report called to Shannan IRAHETA at [address] for bed 4261. RN states understanding and had no further questions.

## 2024-01-09 NOTE — ASSESSMENT & PLAN NOTE
Patient on amphetamines, opiates, testosterone, and Ambien.  Not sure if this is a ideal long-term medication regiment.  Recommend follow-up with PCP to discuss in more depth risks and benefits of continuing.

## 2024-01-09 NOTE — PLAN OF CARE
Problem: Discharge Planning  Goal: Discharge to home or other facility with appropriate resources  Outcome: Progressing     Problem: Safety - Adult  Goal: Free from fall injury  Outcome: Progressing     Problem: Cardiovascular - Adult  Goal: Maintains optimal cardiac output and hemodynamic stability  Outcome: Progressing     Problem: Cardiovascular - Adult  Goal: Absence of cardiac dysrhythmias or at baseline  Outcome: Progressing

## 2024-01-09 NOTE — ED NOTES
Report to Uriel Styles who states understanding. Pt face sheet, ED synopsis, and copies of EKG's to Medic. Pt sent in a Hospital gown, with a NSL PIV. Pt personal items sent with stretcher. Crew assumes Pt care at this time.

## 2024-01-10 ENCOUNTER — TELEPHONE (OUTPATIENT)
Dept: CARDIOLOGY CLINIC | Age: 59
End: 2024-01-10

## 2024-01-10 NOTE — TELEPHONE ENCOUNTER
Noelle, wife of Carlos called the office to schedule f/u per discharge forms. She said that he needs to follow within a week. I offered next available at [place] on 1/23.    Noelle felt that was too far, and asked if there was something sooner. I offered a NP appt. She declined.    Please advise.    Noelle's callback: [phone] or [phone]

## 2024-01-10 NOTE — TELEPHONE ENCOUNTER
There is nothing sooner, Dr. Vivas is out of town next week. He can see Griselda, NP or if determined to see interventionalist sooner than 1/23 - can see if Dr. Shetty has availability. He saw patient in the hospital.

## 2024-01-12 NOTE — PROGRESS NOTES
[facility]      Cardiology Consult    Carlos Ricks  1965  January 12, 2024    Referring Physician: Matteo Johnston Jr., MD  Reason for Referral: hospital f/u    CC: Still having chest discomfort    HPI:  The patient is 58 y.o. male with a past medical history significant for CAD, Anterior MI , LHC 80% ulcerated proximal/ostial HUNTER to ostial LAD 12/04/2023, COPD, GERD and Tobacco Abuse. Was admitted 1/8-1/9/2024 for CP no intervention needed.    Presents for follow up, is still having some shortness of breath and chest pain with exertion - tells me when he does pushups now after a set of 20 he will get very winded and have some anginal symptoms.  His symptoms are somewhat improved with ranolazine, though he was taking once a day didn't realize that it was a BID medication.        Past Medical History:   Diagnosis Date    CAD (coronary artery disease)     COPD (chronic obstructive pulmonary disease) (HCC)     Emphysema lung (HCC)     GERD (gastroesophageal reflux disease)     MRSA (methicillin resistant staph aureus) culture positive 06/12/2019    hand     Past Surgical History:   Procedure Laterality Date    HERNIA REPAIR      ROTATOR CUFF REPAIR Bilateral      No family history on file.  Social History     Tobacco Use    Smoking status: Former     Current packs/day: 1.00     Average packs/day: 1 pack/day for 38.0 years (38.0 ttl pk-yrs)     Types: Cigarettes   Vaping Use    Vaping Use: Never used   Substance Use Topics    Alcohol use: Yes     Comment: seldom    Drug use: No       Allergies   Allergen Reactions    Pravastatin Myalgia     Other reaction(s): Other  Other reaction(s): Muscle Aches  Other reaction(s): Muscle Aches      Adhesive Tape Rash     If left on over 24 hrs; paper tape is OK    If left on over 24 hrs; paper tape is OK If left on over 24 hrs; paper tape is OK  If left on over 24 hrs; paper tape is OK  If left on over 24 hrs; paper tape is OK  If left on over 24 hrs;

## 2024-01-15 ENCOUNTER — OFFICE VISIT (OUTPATIENT)
Dept: CARDIOLOGY CLINIC | Age: 59
End: 2024-01-15
Payer: MEDICARE

## 2024-01-15 VITALS
SYSTOLIC BLOOD PRESSURE: 124 MMHG | WEIGHT: 230 LBS | OXYGEN SATURATION: 97 % | HEART RATE: 93 BPM | BODY MASS INDEX: 28.01 KG/M2 | DIASTOLIC BLOOD PRESSURE: 80 MMHG | HEIGHT: 76 IN

## 2024-01-15 DIAGNOSIS — I25.10 CORONARY ARTERY DISEASE INVOLVING NATIVE CORONARY ARTERY OF NATIVE HEART WITHOUT ANGINA PECTORIS: ICD-10-CM

## 2024-01-15 DIAGNOSIS — I10 ESSENTIAL HYPERTENSION: ICD-10-CM

## 2024-01-15 DIAGNOSIS — R07.9 CHEST PAIN, UNSPECIFIED TYPE: ICD-10-CM

## 2024-01-15 DIAGNOSIS — G47.33 OSA (OBSTRUCTIVE SLEEP APNEA): ICD-10-CM

## 2024-01-15 DIAGNOSIS — I21.02 ST ELEVATION MYOCARDIAL INFARCTION INVOLVING LEFT ANTERIOR DESCENDING (LAD) CORONARY ARTERY (HCC): Primary | ICD-10-CM

## 2024-01-15 PROCEDURE — 93000 ELECTROCARDIOGRAM COMPLETE: CPT | Performed by: STUDENT IN AN ORGANIZED HEALTH CARE EDUCATION/TRAINING PROGRAM

## 2024-01-15 PROCEDURE — 3074F SYST BP LT 130 MM HG: CPT | Performed by: STUDENT IN AN ORGANIZED HEALTH CARE EDUCATION/TRAINING PROGRAM

## 2024-01-15 PROCEDURE — 99214 OFFICE O/P EST MOD 30 MIN: CPT | Performed by: STUDENT IN AN ORGANIZED HEALTH CARE EDUCATION/TRAINING PROGRAM

## 2024-01-15 PROCEDURE — 3079F DIAST BP 80-89 MM HG: CPT | Performed by: STUDENT IN AN ORGANIZED HEALTH CARE EDUCATION/TRAINING PROGRAM

## 2024-02-09 NOTE — PROGRESS NOTES
[facility]    Carlos Ricks  1965    February 13, 2024    Reason for Consult: CAD    CC: \"I'm still tired and have pain\"      HPI:  The patient is 58 y.o. male with a past medical history significant for CAD/STEMI, COPD, nicotine addiction, hypertension and hyperlipidemia who presents for chronic management of CAD. He previously followed with cardiology at [facility]. He reported chest pain and shortness of breath at his cardiology office visit 11/17/22. He ultimately underwent a left heart catheterization 10/4/22 at [facility] resulting in x1 HUNTER to the LAD. He was admitted 12/4/23 for STEMI and underwent a left heart catheterization resulting in x1DES to the LAD. He presented to the ED 1/9/24 for chest pain and his cardiac workup was unremarkable., including a normal NM stress test. He saw Dr. Shetty in office 1/15/24 and was restarted on Ranexa 500 mg BID.     Today he presents for follow up and states that overall he is feeling okay. He continues to report recurrent chest pains he describes as a \"burning\" that essentially remains unchanged. He notes some improvement with his chest pains since restarting Ranexa. However, he is only taking once per day. He also reports occasional shortness of breath that is not particularly related to the chest pain. He reports medication compliance and is tolerating. He denies any abnormal bleeding or bruising. He denies exertional chest pain/pressure, dyspnea at rest, PND, orthopnea, palpitations, lightheadedness, weight changes, changes in LE edema, and syncope.    Review of Systems:  Constitutional: No fatigue, weakness, night sweats or fever.   HEENT: No new vision difficulties or ringing in the ears.  Respiratory: No new SOB, PND, orthopnea or cough.   Cardiovascular: See HPI   GI: No n/v, diarrhea, constipation, abdominal pain or changes in bowel habits.  No melena, no hematochezia  : No urinary frequency, urgency, incontinence,

## 2024-02-13 ENCOUNTER — OFFICE VISIT (OUTPATIENT)
Dept: CARDIOLOGY CLINIC | Age: 59
End: 2024-02-13
Payer: MEDICARE

## 2024-02-13 VITALS
OXYGEN SATURATION: 97 % | HEART RATE: 77 BPM | SYSTOLIC BLOOD PRESSURE: 120 MMHG | BODY MASS INDEX: 27.76 KG/M2 | DIASTOLIC BLOOD PRESSURE: 80 MMHG | WEIGHT: 228 LBS | HEIGHT: 76 IN

## 2024-02-13 DIAGNOSIS — R06.02 SHORTNESS OF BREATH: ICD-10-CM

## 2024-02-13 DIAGNOSIS — K21.9 GASTROESOPHAGEAL REFLUX DISEASE, UNSPECIFIED WHETHER ESOPHAGITIS PRESENT: ICD-10-CM

## 2024-02-13 DIAGNOSIS — R07.89 ATYPICAL CHEST PAIN: ICD-10-CM

## 2024-02-13 DIAGNOSIS — I10 ESSENTIAL HYPERTENSION: ICD-10-CM

## 2024-02-13 DIAGNOSIS — E78.5 HYPERLIPIDEMIA LDL GOAL <70: ICD-10-CM

## 2024-02-13 DIAGNOSIS — I25.10 CORONARY ARTERY DISEASE INVOLVING NATIVE CORONARY ARTERY OF NATIVE HEART WITHOUT ANGINA PECTORIS: Primary | ICD-10-CM

## 2024-02-13 PROCEDURE — 3079F DIAST BP 80-89 MM HG: CPT | Performed by: INTERNAL MEDICINE

## 2024-02-13 PROCEDURE — 3074F SYST BP LT 130 MM HG: CPT | Performed by: INTERNAL MEDICINE

## 2024-02-13 PROCEDURE — 99214 OFFICE O/P EST MOD 30 MIN: CPT | Performed by: INTERNAL MEDICINE

## 2024-02-13 RX ORDER — CLOPIDOGREL BISULFATE 75 MG/1
75 TABLET ORAL DAILY
Qty: 93 TABLET | Refills: 3 | Status: SHIPPED | OUTPATIENT
Start: 2024-02-13

## 2024-02-13 RX ORDER — RANOLAZINE 500 MG/1
500 TABLET, EXTENDED RELEASE ORAL 2 TIMES DAILY
Qty: 180 TABLET | Refills: 3
Start: 2024-02-13

## 2024-02-13 RX ORDER — PHENTERMINE HYDROCHLORIDE 37.5 MG/1
1 CAPSULE ORAL DAILY
COMMUNITY
Start: 2024-01-30 | End: 2024-02-29

## 2024-02-14 ENCOUNTER — TELEPHONE (OUTPATIENT)
Dept: CARDIOLOGY CLINIC | Age: 59
End: 2024-02-14

## 2024-02-14 NOTE — TELEPHONE ENCOUNTER
Submitted PA for CLOPIDOGREL  [facility]: [id number] STATUS: NO PA REQUIRED    Follow up done daily; if no decision with in three days we will refax.  If another three days goes by with no decision will call the insurance for status.

## 2024-02-14 NOTE — TELEPHONE ENCOUNTER
Called spoke with patient is aware clopidogrel is approved through insurance, can  refill at his [facility] pharmacy. States understanding.

## 2024-03-20 ENCOUNTER — HOSPITAL ENCOUNTER (EMERGENCY)
Age: 59
Discharge: HOME | End: 2024-03-20
Attending: EMERGENCY MEDICINE
Payer: MEDICARE

## 2024-03-20 VITALS
SYSTOLIC BLOOD PRESSURE: 152 MMHG | HEIGHT: 75 IN | WEIGHT: 231.26 LBS | HEART RATE: 78 BPM | OXYGEN SATURATION: 97 % | RESPIRATION RATE: 18 BRPM | DIASTOLIC BLOOD PRESSURE: 89 MMHG | TEMPERATURE: 98.7 F | BODY MASS INDEX: 28.75 KG/M2

## 2024-03-20 DIAGNOSIS — S01.111A RIGHT EYELID LACERATION, INITIAL ENCOUNTER: Primary | ICD-10-CM

## 2024-03-20 PROCEDURE — 90471 IMMUNIZATION ADMIN: CPT | Performed by: EMERGENCY MEDICINE

## 2024-03-20 PROCEDURE — 90715 TDAP VACCINE 7 YRS/> IM: CPT | Performed by: EMERGENCY MEDICINE

## 2024-03-20 PROCEDURE — 6360000002 HC RX W HCPCS: Performed by: EMERGENCY MEDICINE

## 2024-03-20 PROCEDURE — 12011 RPR F/E/E/N/L/M 2.5 CM/<: CPT

## 2024-03-20 PROCEDURE — 99284 EMERGENCY DEPT VISIT MOD MDM: CPT

## 2024-03-20 RX ADMIN — TETANUS TOXOID, REDUCED DIPHTHERIA TOXOID AND ACELLULAR PERTUSSIS VACCINE, ADSORBED 0.5 ML: 5; 2.5; 8; 8; 2.5 SUSPENSION INTRAMUSCULAR at 00:40

## 2024-03-20 ASSESSMENT — PAIN SCALES - GENERAL
PAINLEVEL_OUTOF10: 0
PAINLEVEL_OUTOF10: 0

## 2024-03-20 ASSESSMENT — LIFESTYLE VARIABLES
HOW OFTEN DO YOU HAVE A DRINK CONTAINING ALCOHOL: NEVER
HOW MANY STANDARD DRINKS CONTAINING ALCOHOL DO YOU HAVE ON A TYPICAL DAY: PATIENT DOES NOT DRINK
HOW MANY STANDARD DRINKS CONTAINING ALCOHOL DO YOU HAVE ON A TYPICAL DAY: PATIENT DOES NOT DRINK

## 2024-03-20 ASSESSMENT — PAIN - FUNCTIONAL ASSESSMENT: PAIN_FUNCTIONAL_ASSESSMENT: NONE - DENIES PAIN
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
## 2024-04-06 ENCOUNTER — HOSPITAL ENCOUNTER (OUTPATIENT)
Age: 59
Setting detail: OBSERVATION
Discharge: HOME | End: 2024-04-08
Attending: EMERGENCY MEDICINE | Admitting: HOSPITALIST
Payer: MEDICARE

## 2024-04-06 ENCOUNTER — APPOINTMENT (OUTPATIENT)
Dept: GENERAL RADIOLOGY | Age: 59
End: 2024-04-06
Payer: MEDICARE

## 2024-04-06 DIAGNOSIS — R07.9 CHEST PAIN, UNSPECIFIED TYPE: Primary | ICD-10-CM

## 2024-04-06 LAB
ALBUMIN SERPL-MCNC: 4.5 G/DL (ref 3.4–5)
ALBUMIN/GLOB SERPL: 1.6 {RATIO} (ref 1.1–2.2)
ALP SERPL-CCNC: 78 U/L (ref 40–129)
ALT SERPL-CCNC: 27 U/L (ref 10–40)
ANION GAP SERPL CALCULATED.3IONS-SCNC: 10 MMOL/L (ref 3–16)
AST SERPL-CCNC: 21 U/L (ref 15–37)
BASOPHILS # BLD: 0 K/UL (ref 0–0.2)
BASOPHILS NFR BLD: 0.6 %
BILIRUB SERPL-MCNC: 0.6 MG/DL (ref 0–1)
BUN SERPL-MCNC: 14 MG/DL (ref 7–20)
CALCIUM SERPL-MCNC: 9.4 MG/DL (ref 8.3–10.6)
CHLORIDE SERPL-SCNC: 100 MMOL/L (ref 99–110)
CO2 SERPL-SCNC: 27 MMOL/L (ref 21–32)
CREAT SERPL-MCNC: 1.2 MG/DL (ref 0.8–1.3)
DEPRECATED RDW RBC AUTO: 12.5 % (ref 12.4–15.4)
EOSINOPHIL # BLD: 0.1 K/UL (ref 0–0.6)
EOSINOPHIL NFR BLD: 2.7 %
GFR SERPLBLD CREATININE-BSD FMLA CKD-EPI: 69 ML/MIN/{1.73_M2}
GLUCOSE SERPL-MCNC: 101 MG/DL (ref 70–99)
HCT VFR BLD AUTO: 51.4 % (ref 40.5–52.5)
HGB BLD-MCNC: 17.9 G/DL (ref 13.5–17.5)
IMM GRANULOCYTES # BLD: 0 K/UL (ref 0–0.2)
IMM GRANULOCYTES NFR BLD: 0.4 %
LYMPHOCYTES # BLD: 1.2 K/UL (ref 1–5.1)
LYMPHOCYTES NFR BLD: 22.4 %
MCH RBC QN AUTO: 33.2 PG (ref 26–34)
MCHC RBC AUTO-ENTMCNC: 34.8 G/DL (ref 32–36.4)
MCV RBC AUTO: 95.4 FL (ref 80–100)
MONOCYTES # BLD: 0.6 K/UL (ref 0–1.3)
MONOCYTES NFR BLD: 11.7 %
NEUTROPHILS # BLD: 3.3 K/UL (ref 1.7–7.7)
NEUTROPHILS NFR BLD: 62.2 %
NT-PROBNP SERPL-MCNC: <36 PG/ML (ref 0–124)
PLATELET # BLD AUTO: 187 K/UL (ref 135–450)
PMV BLD AUTO: 8.5 FL (ref 5–10.5)
POTASSIUM SERPL-SCNC: 4.4 MMOL/L (ref 3.5–5.1)
PROT SERPL-MCNC: 7.3 G/DL (ref 6.4–8.2)
RBC # BLD AUTO: 5.39 M/UL (ref 4.2–5.9)
SODIUM SERPL-SCNC: 137 MMOL/L (ref 136–145)
TROPONIN, HIGH SENSITIVITY: 6 NG/L (ref 0–22)
TROPONIN, HIGH SENSITIVITY: 7 NG/L (ref 0–22)
WBC # BLD AUTO: 5.2 K/UL (ref 4–11)

## 2024-04-06 PROCEDURE — 71045 X-RAY EXAM CHEST 1 VIEW: CPT

## 2024-04-06 PROCEDURE — 83880 ASSAY OF NATRIURETIC PEPTIDE: CPT

## 2024-04-06 PROCEDURE — 6370000000 HC RX 637 (ALT 250 FOR IP): Performed by: INTERNAL MEDICINE

## 2024-04-06 PROCEDURE — 99285 EMERGENCY DEPT VISIT HI MDM: CPT

## 2024-04-06 PROCEDURE — 93005 ELECTROCARDIOGRAM TRACING: CPT | Performed by: EMERGENCY MEDICINE

## 2024-04-06 PROCEDURE — 2580000003 HC RX 258: Performed by: HOSPITALIST

## 2024-04-06 PROCEDURE — G0378 HOSPITAL OBSERVATION PER HR: HCPCS

## 2024-04-06 PROCEDURE — 84484 ASSAY OF TROPONIN QUANT: CPT

## 2024-04-06 PROCEDURE — 80053 COMPREHEN METABOLIC PANEL: CPT

## 2024-04-06 PROCEDURE — 6370000000 HC RX 637 (ALT 250 FOR IP): Performed by: NURSE PRACTITIONER

## 2024-04-06 PROCEDURE — 6370000000 HC RX 637 (ALT 250 FOR IP): Performed by: EMERGENCY MEDICINE

## 2024-04-06 PROCEDURE — 36415 COLL VENOUS BLD VENIPUNCTURE: CPT

## 2024-04-06 PROCEDURE — 85025 COMPLETE CBC W/AUTO DIFF WBC: CPT

## 2024-04-06 PROCEDURE — 6370000000 HC RX 637 (ALT 250 FOR IP): Performed by: HOSPITALIST

## 2024-04-06 RX ORDER — ONDANSETRON 4 MG/1
4 TABLET, ORALLY DISINTEGRATING ORAL EVERY 8 HOURS PRN
Status: DISCONTINUED | OUTPATIENT
Start: 2024-04-06 | End: 2024-04-08 | Stop reason: HOSPADM

## 2024-04-06 RX ORDER — CLOPIDOGREL BISULFATE 75 MG/1
75 TABLET ORAL DAILY
Status: DISCONTINUED | OUTPATIENT
Start: 2024-04-07 | End: 2024-04-08 | Stop reason: HOSPADM

## 2024-04-06 RX ORDER — MONTELUKAST SODIUM 10 MG/1
10 TABLET ORAL NIGHTLY
Status: DISCONTINUED | OUTPATIENT
Start: 2024-04-06 | End: 2024-04-08 | Stop reason: HOSPADM

## 2024-04-06 RX ORDER — MAGNESIUM SULFATE IN WATER 40 MG/ML
2000 INJECTION, SOLUTION INTRAVENOUS PRN
Status: DISCONTINUED | OUTPATIENT
Start: 2024-04-06 | End: 2024-04-08 | Stop reason: HOSPADM

## 2024-04-06 RX ORDER — ACETAMINOPHEN 650 MG/1
650 SUPPOSITORY RECTAL EVERY 6 HOURS PRN
Status: DISCONTINUED | OUTPATIENT
Start: 2024-04-06 | End: 2024-04-08 | Stop reason: HOSPADM

## 2024-04-06 RX ORDER — ASPIRIN 81 MG/1
81 TABLET, CHEWABLE ORAL DAILY
Status: DISCONTINUED | OUTPATIENT
Start: 2024-04-07 | End: 2024-04-08 | Stop reason: HOSPADM

## 2024-04-06 RX ORDER — POTASSIUM CHLORIDE 20 MEQ/1
40 TABLET, EXTENDED RELEASE ORAL PRN
Status: DISCONTINUED | OUTPATIENT
Start: 2024-04-06 | End: 2024-04-08 | Stop reason: HOSPADM

## 2024-04-06 RX ORDER — RANOLAZINE 500 MG/1
500 TABLET, EXTENDED RELEASE ORAL 2 TIMES DAILY
Status: DISCONTINUED | OUTPATIENT
Start: 2024-04-06 | End: 2024-04-08 | Stop reason: HOSPADM

## 2024-04-06 RX ORDER — POTASSIUM CHLORIDE 7.45 MG/ML
10 INJECTION INTRAVENOUS PRN
Status: DISCONTINUED | OUTPATIENT
Start: 2024-04-06 | End: 2024-04-08 | Stop reason: HOSPADM

## 2024-04-06 RX ORDER — MORPHINE SULFATE 2 MG/ML
2 INJECTION, SOLUTION INTRAMUSCULAR; INTRAVENOUS EVERY 4 HOURS PRN
Status: DISCONTINUED | OUTPATIENT
Start: 2024-04-06 | End: 2024-04-08 | Stop reason: HOSPADM

## 2024-04-06 RX ORDER — OXYCODONE HYDROCHLORIDE AND ACETAMINOPHEN 5; 325 MG/1; MG/1
1 TABLET ORAL EVERY 8 HOURS PRN
Status: DISCONTINUED | OUTPATIENT
Start: 2024-04-06 | End: 2024-04-08 | Stop reason: HOSPADM

## 2024-04-06 RX ORDER — ACETAMINOPHEN 325 MG/1
650 TABLET ORAL EVERY 6 HOURS PRN
Status: DISCONTINUED | OUTPATIENT
Start: 2024-04-06 | End: 2024-04-08 | Stop reason: HOSPADM

## 2024-04-06 RX ORDER — POLYETHYLENE GLYCOL 3350 17 G/17G
17 POWDER, FOR SOLUTION ORAL DAILY PRN
Status: DISCONTINUED | OUTPATIENT
Start: 2024-04-06 | End: 2024-04-08 | Stop reason: HOSPADM

## 2024-04-06 RX ORDER — ZOLPIDEM TARTRATE 5 MG/1
5 TABLET ORAL ONCE
Status: COMPLETED | OUTPATIENT
Start: 2024-04-06 | End: 2024-04-06

## 2024-04-06 RX ORDER — ONDANSETRON 2 MG/ML
4 INJECTION INTRAMUSCULAR; INTRAVENOUS EVERY 6 HOURS PRN
Status: DISCONTINUED | OUTPATIENT
Start: 2024-04-06 | End: 2024-04-08 | Stop reason: HOSPADM

## 2024-04-06 RX ORDER — SODIUM CHLORIDE 9 MG/ML
INJECTION, SOLUTION INTRAVENOUS PRN
Status: DISCONTINUED | OUTPATIENT
Start: 2024-04-06 | End: 2024-04-08 | Stop reason: HOSPADM

## 2024-04-06 RX ORDER — ENOXAPARIN SODIUM 100 MG/ML
40 INJECTION SUBCUTANEOUS DAILY
Status: DISCONTINUED | OUTPATIENT
Start: 2024-04-07 | End: 2024-04-08 | Stop reason: HOSPADM

## 2024-04-06 RX ORDER — ROSUVASTATIN CALCIUM 20 MG/1
20 TABLET, COATED ORAL NIGHTLY
Status: DISCONTINUED | OUTPATIENT
Start: 2024-04-06 | End: 2024-04-08 | Stop reason: HOSPADM

## 2024-04-06 RX ORDER — TAMSULOSIN HYDROCHLORIDE 0.4 MG/1
0.4 CAPSULE ORAL DAILY
Status: DISCONTINUED | OUTPATIENT
Start: 2024-04-07 | End: 2024-04-08 | Stop reason: HOSPADM

## 2024-04-06 RX ORDER — ZOLPIDEM TARTRATE 5 MG/1
5 TABLET ORAL NIGHTLY PRN
Status: DISCONTINUED | OUTPATIENT
Start: 2024-04-06 | End: 2024-04-08 | Stop reason: HOSPADM

## 2024-04-06 RX ORDER — SODIUM CHLORIDE 0.9 % (FLUSH) 0.9 %
5-40 SYRINGE (ML) INJECTION EVERY 12 HOURS SCHEDULED
Status: DISCONTINUED | OUTPATIENT
Start: 2024-04-06 | End: 2024-04-08 | Stop reason: HOSPADM

## 2024-04-06 RX ORDER — DIPHENHYDRAMINE HCL 25 MG
25 TABLET ORAL EVERY 6 HOURS PRN
Status: DISCONTINUED | OUTPATIENT
Start: 2024-04-06 | End: 2024-04-08 | Stop reason: HOSPADM

## 2024-04-06 RX ORDER — SODIUM CHLORIDE 0.9 % (FLUSH) 0.9 %
5-40 SYRINGE (ML) INJECTION PRN
Status: DISCONTINUED | OUTPATIENT
Start: 2024-04-06 | End: 2024-04-08 | Stop reason: HOSPADM

## 2024-04-06 RX ORDER — SENNOSIDES A AND B 8.6 MG/1
8.6 TABLET, FILM COATED ORAL NIGHTLY
Status: DISCONTINUED | OUTPATIENT
Start: 2024-04-06 | End: 2024-04-08 | Stop reason: HOSPADM

## 2024-04-06 RX ORDER — ACYCLOVIR 800 MG/1
800 TABLET ORAL DAILY
Status: DISCONTINUED | OUTPATIENT
Start: 2024-04-07 | End: 2024-04-07

## 2024-04-06 RX ORDER — ROSUVASTATIN CALCIUM 20 MG/1
20 TABLET, COATED ORAL NIGHTLY
Status: DISCONTINUED | OUTPATIENT
Start: 2024-04-06 | End: 2024-04-06 | Stop reason: SDUPTHER

## 2024-04-06 RX ORDER — ASPIRIN 81 MG/1
243 TABLET, CHEWABLE ORAL ONCE
Status: COMPLETED | OUTPATIENT
Start: 2024-04-06 | End: 2024-04-06

## 2024-04-06 RX ADMIN — ROSUVASTATIN CALCIUM 20 MG: 20 TABLET, FILM COATED ORAL at 21:46

## 2024-04-06 RX ADMIN — ASPIRIN 243 MG: 81 TABLET, CHEWABLE ORAL at 17:32

## 2024-04-06 RX ADMIN — DIPHENHYDRAMINE HCL 25 MG: 25 TABLET ORAL at 21:46

## 2024-04-06 RX ADMIN — Medication 10 ML: at 21:50

## 2024-04-06 RX ADMIN — RANOLAZINE 500 MG: 500 TABLET, FILM COATED, EXTENDED RELEASE ORAL at 21:47

## 2024-04-06 RX ADMIN — NITROGLYCERIN 0.5 INCH: 20 OINTMENT TOPICAL at 15:20

## 2024-04-06 RX ADMIN — ACETAMINOPHEN 325MG 650 MG: 325 TABLET ORAL at 21:46

## 2024-04-06 RX ADMIN — SENNOSIDES 8.6 MG: 8.6 TABLET, FILM COATED ORAL at 21:47

## 2024-04-06 RX ADMIN — MONTELUKAST 10 MG: 10 TABLET, FILM COATED ORAL at 21:47

## 2024-04-06 RX ADMIN — ZOLPIDEM TARTRATE 5 MG: 5 TABLET ORAL at 23:39

## 2024-04-06 ASSESSMENT — PAIN - FUNCTIONAL ASSESSMENT
PAIN_FUNCTIONAL_ASSESSMENT: ACTIVITIES ARE NOT PREVENTED

## 2024-04-06 ASSESSMENT — LIFESTYLE VARIABLES
HOW MANY STANDARD DRINKS CONTAINING ALCOHOL DO YOU HAVE ON A TYPICAL DAY: PATIENT DOES NOT DRINK
HOW OFTEN DO YOU HAVE A DRINK CONTAINING ALCOHOL: NEVER

## 2024-04-06 ASSESSMENT — PAIN DESCRIPTION - ONSET
ONSET: GRADUAL
ONSET: GRADUAL

## 2024-04-06 ASSESSMENT — PAIN DESCRIPTION - FREQUENCY
FREQUENCY: INTERMITTENT
FREQUENCY: INTERMITTENT

## 2024-04-06 ASSESSMENT — PAIN DESCRIPTION - PAIN TYPE
TYPE: ACUTE PAIN
TYPE: ACUTE PAIN

## 2024-04-06 ASSESSMENT — PAIN SCALES - GENERAL
PAINLEVEL_OUTOF10: 0
PAINLEVEL_OUTOF10: 5
PAINLEVEL_OUTOF10: 0
PAINLEVEL_OUTOF10: 0
PAINLEVEL_OUTOF10: 2

## 2024-04-06 ASSESSMENT — PAIN DESCRIPTION - LOCATION
LOCATION: HEAD
LOCATION: HEAD
LOCATION: CHEST
LOCATION: HEAD

## 2024-04-06 ASSESSMENT — HEART SCORE: ECG: NORMAL

## 2024-04-06 ASSESSMENT — PAIN DESCRIPTION - ORIENTATION
ORIENTATION: RIGHT;LEFT;MID

## 2024-04-06 ASSESSMENT — PAIN DESCRIPTION - DESCRIPTORS
DESCRIPTORS: ACHING;DISCOMFORT;DULL

## 2024-04-06 NOTE — ED PROVIDER NOTES
none    LABS:  Labs Reviewed   CBC WITH AUTO DIFFERENTIAL - Abnormal; Notable for the following components:       Result Value    Hemoglobin 17.9 (*)     All other components within normal limits   COMPREHENSIVE METABOLIC PANEL - Abnormal; Notable for the following components:    Glucose 101 (*)     All other components within normal limits   BRAIN NATRIURETIC PEPTIDE   TROPONIN   TROPONIN       All other labs were within normal range or not returned as of this dictation.    EMERGENCY DEPARTMENT COURSE and DIFFERENTIAL DIAGNOSIS/MDM:   Vitals:    Vitals:    04/06/24 1615 04/06/24 1626 04/06/24 1644 04/06/24 1700   BP: 131/86 128/86 129/84 128/84   Pulse: 63 65 67 61   Resp: 17 14 11 14   Temp:       TempSrc:       SpO2: 96% 96% 96% 95%   Weight:           Patient was given the following medications:  Medications   aspirin chewable tablet 243 mg (has no administration in time range)   nitroglycerin (NITRO-BID) 2 % ointment 0.5 inch (0.5 inches Topical Given 4/6/24 1520)             Is this patient to be included in the SEP-1 Core Measure due to severe sepsis or septic shock?   No   Exclusion criteria - the patient is NOT to be included for SEP-1 Core Measure due to:  Infection is not suspected    Chronic Conditions affecting care: Below   has a past medical history of CAD (coronary artery disease), COPD (chronic obstructive pulmonary disease) (HCC), Emphysema lung (HCC), GERD (gastroesophageal reflux disease), and MRSA (methicillin resistant staph aureus) culture positive (06/12/2019).    CONSULTS: (Who and What was discussed)  IP CONSULT TO HOSPITALIST  [facility] Consult Hospitalist / Dr. Mcnamara    Social Determinants : None    Records Reviewed (Source): PMH / Medications / EPIC    CC/HPI Summary, DDx, ED Course, and Reassessment: Patient states she has been having intermittent chest pain since yesterday.  Pain was more severe about 30 minutes prior to arrival.  It started at rest.  He took 2 nitroglycerin which
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
## 2024-04-07 LAB
EKG ATRIAL RATE: 65 BPM
EKG DIAGNOSIS: NORMAL
EKG P AXIS: 62 DEGREES
EKG P-R INTERVAL: 166 MS
EKG Q-T INTERVAL: 372 MS
EKG QRS DURATION: 112 MS
EKG QTC CALCULATION (BAZETT): 386 MS
EKG R AXIS: -5 DEGREES
EKG T AXIS: 68 DEGREES
EKG VENTRICULAR RATE: 65 BPM
GLUCOSE BLD-MCNC: 89 MG/DL (ref 70–99)
GLUCOSE BLD-MCNC: 90 MG/DL (ref 70–99)
PERFORMED ON: NORMAL
PERFORMED ON: NORMAL

## 2024-04-07 PROCEDURE — 6370000000 HC RX 637 (ALT 250 FOR IP): Performed by: HOSPITALIST

## 2024-04-07 PROCEDURE — G0378 HOSPITAL OBSERVATION PER HR: HCPCS

## 2024-04-07 PROCEDURE — 94760 N-INVAS EAR/PLS OXIMETRY 1: CPT

## 2024-04-07 PROCEDURE — 87635 SARS-COV-2 COVID-19 AMP PRB: CPT

## 2024-04-07 PROCEDURE — 99215 OFFICE O/P EST HI 40 MIN: CPT | Performed by: INTERNAL MEDICINE

## 2024-04-07 PROCEDURE — 93010 ELECTROCARDIOGRAM REPORT: CPT | Performed by: INTERNAL MEDICINE

## 2024-04-07 PROCEDURE — 6370000000 HC RX 637 (ALT 250 FOR IP): Performed by: INTERNAL MEDICINE

## 2024-04-07 PROCEDURE — 6370000000 HC RX 637 (ALT 250 FOR IP): Performed by: NURSE PRACTITIONER

## 2024-04-07 PROCEDURE — 2580000003 HC RX 258: Performed by: HOSPITALIST

## 2024-04-07 RX ORDER — PHENTERMINE HYDROCHLORIDE 37.5 MG/1
1 CAPSULE ORAL DAILY
COMMUNITY
Start: 2024-03-29 | End: 2024-04-28

## 2024-04-07 RX ORDER — CETIRIZINE HYDROCHLORIDE 10 MG/1
10 TABLET ORAL DAILY
Status: DISCONTINUED | OUTPATIENT
Start: 2024-04-07 | End: 2024-04-08 | Stop reason: HOSPADM

## 2024-04-07 RX ORDER — ALBUTEROL SULFATE 90 UG/1
2 AEROSOL, METERED RESPIRATORY (INHALATION) EVERY 6 HOURS PRN
COMMUNITY

## 2024-04-07 RX ORDER — ACYCLOVIR 800 MG/1
800 TABLET ORAL DAILY
Status: DISCONTINUED | OUTPATIENT
Start: 2024-04-07 | End: 2024-04-08 | Stop reason: HOSPADM

## 2024-04-07 RX ADMIN — RANOLAZINE 500 MG: 500 TABLET, FILM COATED, EXTENDED RELEASE ORAL at 07:51

## 2024-04-07 RX ADMIN — CLOPIDOGREL BISULFATE 75 MG: 75 TABLET ORAL at 07:52

## 2024-04-07 RX ADMIN — ACYCLOVIR 800 MG: 800 TABLET ORAL at 11:43

## 2024-04-07 RX ADMIN — Medication 10 ML: at 07:53

## 2024-04-07 RX ADMIN — Medication 10 ML: at 20:53

## 2024-04-07 RX ADMIN — MONTELUKAST 10 MG: 10 TABLET, FILM COATED ORAL at 20:53

## 2024-04-07 RX ADMIN — ZOLPIDEM TARTRATE 5 MG: 5 TABLET ORAL at 20:53

## 2024-04-07 RX ADMIN — ROSUVASTATIN CALCIUM 20 MG: 20 TABLET, FILM COATED ORAL at 20:53

## 2024-04-07 RX ADMIN — SENNOSIDES 8.6 MG: 8.6 TABLET, FILM COATED ORAL at 20:53

## 2024-04-07 RX ADMIN — ASPIRIN 81 MG: 81 TABLET, CHEWABLE ORAL at 07:51

## 2024-04-07 RX ADMIN — TAMSULOSIN HYDROCHLORIDE 0.4 MG: 0.4 CAPSULE ORAL at 07:51

## 2024-04-07 RX ADMIN — CETIRIZINE HYDROCHLORIDE 10 MG: 10 TABLET, FILM COATED ORAL at 11:43

## 2024-04-07 RX ADMIN — DIPHENHYDRAMINE HCL 25 MG: 25 TABLET ORAL at 20:53

## 2024-04-07 RX ADMIN — RANOLAZINE 500 MG: 500 TABLET, FILM COATED, EXTENDED RELEASE ORAL at 20:53

## 2024-04-07 NOTE — PROGRESS NOTES
Patient is asking to have his home medication of phentermine and adderall ordered.  Call out to Poly Reddy NP, per patient request.

## 2024-04-07 NOTE — PROGRESS NOTES
Patient alert and oriented x4, VSS, sitting up in bed waiting for breakfast. Patient is asking for coffee, educated patient on diet restrictions d/t cardiac symptoms and potential testing.  Patietn denies n/v, diarrhea, SOB, pain, denies chest pain.  Patient has home C-Pap machine in room.  Patient denies further needs at this time. Bed in lowest and locked position.  Patient is UAL and tolerating well, non-slip socks on, call light in reach.

## 2024-04-07 NOTE — CARE COORDINATION
Quick chart review reavealed that patient is from home, UAL, on RA, and A&Ox4.   Pt has PCP- Dr. Johnston, and insurance. At this time, an assessment is not warranted. Will follow for needs.   Discharge Planning:      (CM) reviewed the patient's chart to assess needs. Patient's Readmission Risk Score is   . Patient's medical insurance is  Payor: [employer] [facility] MEDICARE / Plan: AET MEDICARE ADVANTAGE HMO / Product Type: Medicare / .  Patient's PCP is Matteo Johnston Jr., MD .  No needs anticipated, at this time. CM team to follow. Staff to inform CM if additional discharge needs arise.    Pts preferred pharmacy is   [id number] - [place], [place] - [address] [place] [address] - P [phone] - F [phone] [address] [place] [address]  Phone: [phone] Fax: [phone]     Mark Talamantes LMSW, [facility] Social Work Case Management   Phone: [phone]  Fax: [phone]

## 2024-04-07 NOTE — FLOWSHEET NOTE
4 Eyes Skin Assessment     NAME:  Carlos Ricks  YOB: 1965  MEDICAL RECORD NUMBER:  [id number]    The patient is being assessed for  Admission    I agree that at least one RN has performed a thorough Head to Toe Skin Assessment on the patient. ALL assessment sites listed below have been assessed.      Areas assessed by both nurses:    Head, Face, Ears, Shoulders, Back, Chest, Arms, Elbows, Hands, Sacrum. Buttock, Coccyx, Ischium, Legs. Feet and Heels, and Under Medical Devices         Does the Patient have a Wound? No noted wound(s)       Edwin Prevention initiated by RN: No  Wound Care Orders initiated by RN: No    Pressure Injury (Stage 3,4, Unstageable, DTI, NWPT, and Complex wounds) if present, place Wound referral order by RN under : No    New Ostomies, if present place, Ostomy referral order under : No     Nurse 1 eSignature: Electronically signed by Sheryl Chou RN on 4/7/24 at 3:59 AM EDT    **SHARE this note so that the co-signing nurse can place an eSignature**    Nurse 2 eSignature: Electronically signed by Kylee Skelton RN on 4/7/24 at 4:27 AM EDT

## 2024-04-07 NOTE — PROGRESS NOTES
Pharmacy Medication Reconciliation Note     List of medications patient is currently taking is complete.    Source of information:   1. Conversation with patient   2. EMR    Notes regarding home medications:   Removed amlodipine and Adderall from home med list. Added albuterol inhaler and phentermine. Increased senna to 3 tabs nightly.      Elena Trotter PharmD  4/7/2024 11:33 AM

## 2024-04-07 NOTE — PROGRESS NOTES
Hospital Medicine Progress Note      Date of Admission: 4/6/2024  Hospital Day: 2    Chief Admission Complaint:  chest pain      Subjective:  Patient resting in bed in no acute distress. He reports resolution of chest pain after nitroglycerin. He complains of \"nasal congestion\" Denies SOB or abdominal pain.     Presenting Admission History:       \"59 y.o. male who presented to [facility] with chest pain.  PMHx significant for CAD s/p PCI with 2 stents, COPD, emphysema, obesity, essential hypertension, hyperlipidemia, bilateral carotid stenosis who presented to ED of [place] with a complaint of chest pain.  Patient indicates that he started having chest pain which is characterized as burning which has been ongoing for quite some time however got worse today and it was relieved after taking 2 nitroglycerin.  Patient denies any radiation.  Patient did have some shortness of breath associated with his chest discomfort.  No diaphoresis, dizziness or lightheadedness.  Patient is a former smoker who quit smoking about 9 years ago.  He does have history of coronary artery disease with prior stenting in the past.  Patient endorses compliance with his medication.  Patient currently indicates that he has minimal chest pain.  In ED patient was found to have an EKG that showed no evidence of acute ischemic changes.  Troponins were negative X2.  Patient is currently transferred to [facility] for further evaluation [place]\"    Assessment/Plan:      Current Principal Problem:  Chest pain    Precordial chest pain  Troponin normal  EKG: NSR rate 65 . No ECG changes   1/9/2024 Echocardiogram: Ejection fraction is visually estimated to be 55 %.Septal asymmetric hypertrophy. Grade I diastolic dysfunction with normal LV filling pressures.The left atrium is mildly dilated. The right ventricle is mildly enlarged  Cardiology consulted, appreciate assistance  NPO after midnight possible cardiac catheterization  Strict
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
## 2024-04-07 NOTE — FLOWSHEET NOTE
Pt arrived from [place] with Dx:  Chest pain. Ext cardiac hx. AAO x 4, UAL, RA, on tele. No skin impairments. Oriented to room and call light. No chest pain at this time. Hx of MI/HTN, Sleep apnea and family will bring C-pap from home. Very pleasant and cooperative. Call light in reach.

## 2024-04-07 NOTE — PLAN OF CARE
Problem: Discharge Planning  Goal: Discharge to home or other facility with appropriate resources  4/7/2024 0901 by Alivia Parra RN  Outcome: Progressing  4/7/2024 0044 by Sheryl Chou RN  Outcome: Progressing  Flowsheets (Taken 4/7/2024 0042)  Discharge to home or other facility with appropriate resources: Identify barriers to discharge with patient and caregiver  4/6/2024 2329 by Sheryl Chou RN  Outcome: Progressing  Flowsheets (Taken 4/6/2024 2017)  Discharge to home or other facility with appropriate resources: Identify barriers to discharge with patient and caregiver     Problem: Pain  Goal: Verbalizes/displays adequate comfort level or baseline comfort level  4/7/2024 0901 by Alivia Parra RN  Outcome: Progressing  4/7/2024 0044 by Sheryl Chou RN  Outcome: Progressing  4/6/2024 2329 by Sheryl Chou RN  Outcome: Progressing     Problem: Safety - Adult  Goal: Free from fall injury  4/7/2024 0901 by Alivai Parra RN  Outcome: Progressing  4/7/2024 0044 by Sheryl Chou RN  Outcome: Progressing

## 2024-04-07 NOTE — H&P
Hospital Medicine History & Physical      Date of Admission: 4/6/2024    Date of Service:  Pt seen/examined on 4/6/2024     []Admitted to Inpatient with expected LOS greater than two midnights due to medical therapy.  [x]Placed in Observation status.    Chief Admission Complaint: Chest pain    Presenting Admission History:      59 y.o. male who presented to [facility] with chest pain.  PMHx significant for CAD s/p PCI with 2 stents, COPD, emphysema, obesity, essential hypertension, hyperlipidemia, bilateral carotid stenosis who presented to ED of [place] with a complaint of chest pain.  Patient indicates that he started having chest pain which is characterized as burning which has been ongoing for quite some time however got worse today and it was relieved after taking 2 nitroglycerin.  Patient denies any radiation.  Patient did have some shortness of breath associated with his chest discomfort.  No diaphoresis, dizziness or lightheadedness.  Patient is a former smoker who quit smoking about 9 years ago.  He does have history of coronary artery disease with prior stenting in the past.  Patient endorses compliance with his medication.  Patient currently indicates that he has minimal chest pain.  In ED patient was found to have an EKG that showed no evidence of acute ischemic changes.  Troponins were negative X2.  Patient is currently transferred to [facility] for further evaluation [facility]    Assessment:    Chest pain, with typical and atypical features.  Coronary artery disease s/p PCI with 2 stents in the past.  COPD with emphysema.  Essential hypertension.  Hyperlipidemia.  History of bilateral carotid stenosis      Plan  Patient is assigned to observation status.  Troponins are negative x 2 and no ischemic changes on EKG.  Patient did have a negative stress test back in January, 2024  Cardiology has been consulted for further recommendations.  Resume home meds as appropriate.  SQ Lovenox for DVT

## 2024-04-07 NOTE — FLOWSHEET NOTE
Pt slept off and on overnight. No c/o cardiac s/s or any other distress. VSS. Used C-pap overnight. Uses call light as needed.

## 2024-04-07 NOTE — PLAN OF CARE
Problem: Discharge Planning  Goal: Discharge to home or other facility with appropriate resources  Outcome: Progressing   Continuing to work with patient and health care team on discharge plan. Discharge instructions and medication management will be reviewed prior to discharge.    Problem: Pain  Goal: Verbalizes/displays adequate comfort level or baseline comfort level  Outcome: Progressing   Pt able to express presence/absence of pain and rate pain appropriately using numerical scale. Pain/discomfort being managed with PRN analgesics per MD orders (see MAR). Pain assessed every shift and after interventions.

## 2024-04-07 NOTE — CONSULTS
(KLOR-CON M) extended release tablet 40 mEq, PRN   Or  potassium bicarb-citric acid (EFFER-K) effervescent tablet 40 mEq, PRN   Or  potassium chloride 10 mEq/100 mL IVPB (Peripheral Line), PRN  magnesium sulfate 2000 mg in 50 mL IVPB premix, PRN  ondansetron (ZOFRAN-ODT) disintegrating tablet 4 mg, Q8H PRN   Or  ondansetron (ZOFRAN) injection 4 mg, Q6H PRN  acetaminophen (TYLENOL) tablet 650 mg, Q6H PRN   Or  acetaminophen (TYLENOL) suppository 650 mg, Q6H PRN  polyethylene glycol (GLYCOLAX) packet 17 g, Daily PRN  [START ON 4/7/2024] aspirin chewable tablet 81 mg, Daily  [START ON 4/7/2024] enoxaparin (LOVENOX) injection 40 mg, Daily  morphine (PF) injection 2 mg, Q4H PRN  diphenhydrAMINE (BENADRYL) tablet 25 mg, Q6H PRN  rosuvastatin (CRESTOR) tablet 20 mg, Nightly  [START ON 4/7/2024] acyclovir (ZOVIRAX) tablet 800 mg, Daily  [START ON 4/7/2024] clopidogrel (PLAVIX) tablet 75 mg, Daily  montelukast (SINGULAIR) tablet 10 mg, Nightly  oxyCODONE-acetaminophen (PERCOCET) 5-325 MG per tablet 1 tablet, Q8H PRN  ranolazine (RANEXA) extended release tablet 500 mg, BID  senna (SENOKOT) tablet 8.6 mg, Nightly  [START ON 4/7/2024] tamsulosin (FLOMAX) capsule 0.4 mg, Daily  zolpidem (AMBIEN) tablet 5 mg, Nightly PRN        Allergies:  Pravastatin, Adhesive tape, and Codeine           Review of Systems: SEE HPI   Constitutional: no unanticipated weight loss. There's been no change in energy level, sleep pattern, or activity level. No fevers, chills.   Eyes: No visual changes or diplopia. No scleral icterus.  ENT: No Headaches, hearing loss or vertigo. No mouth sores or sore throat.  Cardiovascular: No Chest pain, tightness or discomfort.   No Shortness of breath.   No Dyspnea on exertion, Orthopnea, Paroxysmal nocturnal dyspnea or breathlessness at rest.  No Palpitations.  No Syncope ('blackouts', 'faints', 'collapse') or dizziness.   Respiratory: No cough or wheezing, no sputum production. No hematemesis.

## 2024-04-07 NOTE — PLAN OF CARE
Problem: Discharge Planning  Goal: Discharge to home or other facility with appropriate resources  4/7/2024 0044 by Sheryl Chou, RN  Outcome: Progressing  Flowsheets (Taken 4/7/2024 0042)  Discharge to home or other facility with appropriate resources: Identify barriers to discharge with patient and caregiver  4/6/2024 2329 by Sheryl Chou, RN  Outcome: Progressing  Flowsheets (Taken 4/6/2024 2017)  Discharge to home or other facility with appropriate resources: Identify barriers to discharge with patient and caregiver    Problem: Pain  Goal: Verbalizes/displays adequate comfort level or baseline comfort level  4/7/2024 0044 by Sheryl Chou, RN  Outcome: Progressing  4/6/2024 2329 by Sheryl Chou, RN  Outcome: Progressing      Pt able to express presence/absence of pain and rate pain appropriately using numerical scale. Pain/discomfort being managed with PRN analgesics per MD orders (see MAR). Pain assessed every shift and after interventions.    Problem: Safety - Adult  Goal: Free from fall injury  Outcome: Progressing   Pt free from falls this shift. Fall precautions in place at all times. Call light always within reach. Pt able and agreeable to contact for safety appropriately.

## 2024-04-08 VITALS
SYSTOLIC BLOOD PRESSURE: 152 MMHG | DIASTOLIC BLOOD PRESSURE: 92 MMHG | TEMPERATURE: 97.8 F | HEART RATE: 78 BPM | OXYGEN SATURATION: 93 % | BODY MASS INDEX: 28.65 KG/M2 | WEIGHT: 230.38 LBS | RESPIRATION RATE: 15 BRPM | HEIGHT: 75 IN

## 2024-04-08 PROCEDURE — 6370000000 HC RX 637 (ALT 250 FOR IP): Performed by: INTERNAL MEDICINE

## 2024-04-08 PROCEDURE — 6370000000 HC RX 637 (ALT 250 FOR IP): Performed by: NURSE PRACTITIONER

## 2024-04-08 PROCEDURE — 6370000000 HC RX 637 (ALT 250 FOR IP): Performed by: HOSPITALIST

## 2024-04-08 PROCEDURE — 99233 SBSQ HOSP IP/OBS HIGH 50: CPT | Performed by: INTERNAL MEDICINE

## 2024-04-08 PROCEDURE — 2580000003 HC RX 258: Performed by: HOSPITALIST

## 2024-04-08 PROCEDURE — G0378 HOSPITAL OBSERVATION PER HR: HCPCS

## 2024-04-08 PROCEDURE — 94760 N-INVAS EAR/PLS OXIMETRY 1: CPT

## 2024-04-08 RX ADMIN — ACYCLOVIR 800 MG: 800 TABLET ORAL at 08:53

## 2024-04-08 RX ADMIN — ASPIRIN 81 MG: 81 TABLET, CHEWABLE ORAL at 08:53

## 2024-04-08 RX ADMIN — Medication 10 ML: at 08:53

## 2024-04-08 RX ADMIN — TAMSULOSIN HYDROCHLORIDE 0.4 MG: 0.4 CAPSULE ORAL at 08:53

## 2024-04-08 RX ADMIN — RANOLAZINE 500 MG: 500 TABLET, FILM COATED, EXTENDED RELEASE ORAL at 08:53

## 2024-04-08 RX ADMIN — CLOPIDOGREL BISULFATE 75 MG: 75 TABLET ORAL at 08:53

## 2024-04-08 RX ADMIN — CETIRIZINE HYDROCHLORIDE 10 MG: 10 TABLET, FILM COATED ORAL at 08:53

## 2024-04-08 ASSESSMENT — PAIN SCALES - GENERAL: PAINLEVEL_OUTOF10: 0

## 2024-04-08 NOTE — DISCHARGE SUMMARY
Hospital Medicine Discharge Summary    Patient ID: Carlos Ricks      Patient's PCP: Matteo Johnston Jr., MD    Admit Date: 4/6/2024     Discharge Date:   4/8/24    Admitting Physician: Quan Rubi MD     Discharge Physician: Holley Weller PA-C     Discharge Diagnoses  Pericardial chest pain  CAD s/p HUNTER to LAD x 2  History of essential hypertension  Hyperlipidemia  Nasal congestion      Hospital Course: 59-year-old male with past medical history of CAD s/p PCI with 2 stents, COPD, emphysema, hypertension, hyperlipidemia presented to the ED with chest pain.  While in ED, troponins were negative x 2, EKG showed no ST elevation.  He was evaluated by cardiology Dr. Serrano 4/7, recommend further evaluation with patient's primary cardiologist to determine need for cardiac cath.  Dr. Vivas evaluated 4/8 and reccommended no further intervention or testing and was okay to discharge home.    Precordial chest pain  CAD s/p HUNTER to LAD x2 (12/4/2023)  Troponin normal  EKG: NSR rate 65 . No ECG changes   1/9/2024 Echocardiogram: Ejection fraction is visually estimated to be 55 %.Septal asymmetric hypertrophy. Grade I diastolic dysfunction with normal LV filling pressures.The left atrium is mildly dilated. The right ventricle is mildly enlarged  Cardiology consulted, primary cardiologist is Dr. Vivas evaluated 4/8.  Recommend no further intervention or testing, cleared to discharge home on same medical regimen.  Strict I&O's, daily weights   Continue aspirin, statin, Ranexa and Plavix  Telemetry monitoring      History of essential Hypertension  Not currently on BP meds per med rec review  BP mostly well-controlled, slightly elevated prior to discharge.  Recommend following up with PCP for further BP med management to discuss resuming home amlodipine which he is no longer taking    Hyperlipidemia  -Resume home statin        Nasal congestion  Zyrtec daily + Singulair  Rapid covid per patient request

## 2024-04-08 NOTE — PROGRESS NOTES
Discharge instructions discussed with Pt. IV removed without complications. Dry dressing applied. Tele monitor removed. Pt ambulatory for discharge.

## 2024-04-08 NOTE — PLAN OF CARE
Problem: Discharge Planning  Goal: Discharge to home or other facility with appropriate resources  4/7/2024 2235 by Sheryl Chou RN  Outcome: Progressing  4/7/2024 0901 by Alivia Parra RN  Outcome: Progressing   Continuing to work with patient and health care team on discharge plan. Discharge instructions and medication management will be reviewed prior to discharge.    Problem: Pain  Goal: Verbalizes/displays adequate comfort level or baseline comfort level  4/7/2024 2235 by Sheryl Chou RN  Outcome: Progressing  4/7/2024 0901 by Alivia Parra RN  Outcome: Progressing   Pt able to express presence/absence of pain and rate pain appropriately using numerical scale. Pain/discomfort being managed with PRN analgesics per MD orders (see MAR). Pain assessed every shift and after interventions.    Problem: Safety - Adult  Goal: Free from fall injury  4/7/2024 2235 by Sheryl Chou RN  Outcome: Progressing  4/7/2024 0901 by Alivia Parra RN  Outcome: Progressing   Pt free from falls this shift. Fall precautions in place at all times. Call light always within reach. Pt able and agreeable to contact for safety appropriately.

## 2024-04-08 NOTE — PROGRESS NOTES
[facility]   Daily Progress Note      Admit Date:  4/6/2024      Subjective:   Mr. Ricks is a 58 y.o. male with a past medical history significant for CAD/STEMI, COPD, nicotine addiction, hypertension and hyperlipidemia who presents for chronic management of CAD. He previously followed with cardiology at [facility]. He reported chest pain and shortness of breath at his cardiology office visit 11/17/22. He ultimately underwent a left heart catheterization 10/4/22 at [facility] resulting in x1 HUNTER to the LAD. He was admitted 12/4/23 for STEMI and underwent a left heart catheterization resulting in x1DES to the LAD. He presented to the ED 1/9/24 for chest pain and his cardiac workup was unremarkable., including a normal NM stress test. He saw Dr. Sehtty in office 1/15/24 and was restarted on Ranexa 500 mg BID. I saw him in the office 2/14/24 at which time he was doing fine.     He states he was doing dips and pullups at the gym two days ago. He was most concerned because of the left neck pain. He has not had this pain in the past.     Interval:  He denies any further chest pain or shortness of breath. Sinuns rhythm on telemetry. No events overnight.     Objective:     BP (!) 149/92   Pulse 79   Temp 98.1 °F (36.7 °C)   Resp 15   Ht 1.905 m (6' 3\")   Wt 104.5 kg (230 lb 6.1 oz)   SpO2 95%   BMI 28.80 kg/m²      Intake/Output Summary (Last 24 hours) at 4/8/2024 1240  Last data filed at 4/7/2024 1303  Gross per 24 hour   Intake 260 ml   Output --   Net 260 ml       Physical Exam:  General:  Awake, alert, NAD  Skin:  Warm and dry  Neck:  JVD<8, no carotid bruits  Chest:  Clear to auscultation, no wheezes/rhonchi/rales  Cardiovascular:  RRR, normal S1/S2, no M/R/G  Abdomen:  Soft, nontender, +bowel sounds  Extremities:  No edema  Pulses: 2+ bilat carotid    2+ bilat radial    2+ bilat femoral        Medications:    acyclovir  800 mg Oral Daily    cetirizine  10 mg Oral Daily    sodium chloride flush

## 2024-04-08 NOTE — PROGRESS NOTES
Hospitalist Progress Note      PCP: Matteo Johnston Jr., MD    Date of Admission: 4/6/2024    Chief Complaint: Chest pain    Hospital Course: 59-year-old male with past medical history of CAD s/p PCI with 2 stents, COPD, emphysema, hypertension, hyperlipidemia presented to the ED with chest pain.  While in ED, troponins were negative x 2, EKG showed no ST elevation.  He was evaluated by cardiology Dr. Serrano 4/7, recommend further evaluation with patient's primary cardiologist to determine need for cardiac cath.  Dr. Vivas evaluated 4/8, cardiac cath now planned for 4/8.    Subjective: Patient seen lying in bed, states he is very hungry.  Appears to be frustrated with his recent cardiac issues.  Having some chest pain right now but it is towards the lower left side, not substernal crushing like his previous heart attacks.  No nausea or vomiting currently.  No shortness of breath or diaphoresis currently.  Discussed reaching out to his primary RN to find out a cardiac cath that been scheduled yet.    Assessment/Plan:  Precordial chest pain  CAD s/p HUNTER to LAD x2 (12/4/2023)  Troponin normal  EKG: NSR rate 65 . No ECG changes   1/9/2024 Echocardiogram: Ejection fraction is visually estimated to be 55 %.Septal asymmetric hypertrophy. Grade I diastolic dysfunction with normal LV filling pressures.The left atrium is mildly dilated. The right ventricle is mildly enlarged  Cardiology consulted, primary cardiologist is Dr. Vivas.  Cardiac cath now planned 4/8 at 2 PM  Strict I&O's, daily weights   Continue aspirin, statin, Ranexa and Plavix  Telemetry monitoring      History of essential Hypertension  Not currently on BP meds per med rec review  Monitor trend, BP trending up this morning.  Consider restarting BP meds if pressures remain elevated.  Telemetry monitoring     Hyperlipidemia  -Resume home statin          Nasal congestion  Zyrtec daily + Singulair  Rapid covid per patient request pending.

## 2024-04-10 LAB — SARS-COV-2 RDRP RESP QL NAA+PROBE: NOT DETECTED

## 2024-06-24 RX ORDER — RANOLAZINE 500 MG/1
500 TABLET, EXTENDED RELEASE ORAL 2 TIMES DAILY
Qty: 180 TABLET | Refills: 3 | Status: SHIPPED | OUTPATIENT
Start: 2024-06-24

## 2024-06-24 NOTE — TELEPHONE ENCOUNTER
Medication Refill    Medication needing refilled: ranolazine (RANEXA)     Dosage of the medication: 500 MG extended release tablet     How are you taking this medication (QD, BID, TID, QID, PRN):     30 or 90 day supply called in: 90 day     When will you run out of your medication:    Which Pharmacy are we sending the medication to?:  [facility] [id number] - [place], [place] - [address] [place] [address] - [address] [phone] - F [phone]

## 2024-10-09 ENCOUNTER — TELEPHONE (OUTPATIENT)
Dept: CARDIOLOGY CLINIC | Age: 59
End: 2024-10-09

## 2024-10-09 DIAGNOSIS — R06.02 SHORTNESS OF BREATH: Primary | ICD-10-CM

## 2024-10-09 NOTE — TELEPHONE ENCOUNTER
Noelle (spouse) called in statin that Carlos is becoming more SOB, looking pale to greenish color and Lázaro states he feels he almost passes out when standing. Noelle shares she knows he has COPD but would like to get him into a pulmonologist. Noelle would like [facility] to recommend the \"best of the best\" in pulmonology.    Please advise    Noelle's callback: [phone]

## 2024-10-09 NOTE — TELEPHONE ENCOUNTER
Called and spoke with Noelle and she states for a couple weeks he has not been feeling well states is pale and sob and having some dizziness, no cp   She is requesting a pulmonology referral   I encouraged her to see PCP or go to the ED for tx  Verbalized understanding

## 2024-11-06 RX ORDER — ROSUVASTATIN CALCIUM 20 MG/1
20 TABLET, COATED ORAL DAILY
Qty: 30 TABLET | Refills: 3 | Status: SHIPPED | OUTPATIENT
Start: 2024-11-06

## 2024-11-06 NOTE — TELEPHONE ENCOUNTER
Medication Refill    When was your last appointment with cardiology?    (If 1 yr or longer, please schedule appointment)    (If patient has been told they do not need to follow-up - medications should be filled by PCP)  02/2024    When did you last have labs drawn?   Patient states that he had labs drawn by PCP, Dr. Johnston. Called PCP office to request labs be sent to office.     Medication needing refilled?  rosuvastatin (CRESTOR)     Dosage of the medication?  20 MG tablet     How are you taking this medication (QD, BID, TID, QID, PRN)?  Take 1 tablet by mouth daily     Do you want a 30 or 90 day supply?  30 day supply    When will you run out of your medication?     Which Pharmacy are we sending this medication to?  [facility] [id number] - [facility] [address] [address]     Pharmacy Phone: [phone]  Pharmacy Fax:[phone]

## 2024-12-11 ENCOUNTER — OFFICE VISIT (OUTPATIENT)
Dept: PULMONOLOGY | Age: 59
End: 2024-12-11
Payer: MEDICARE

## 2024-12-11 VITALS
SYSTOLIC BLOOD PRESSURE: 152 MMHG | HEIGHT: 75 IN | OXYGEN SATURATION: 99 % | DIASTOLIC BLOOD PRESSURE: 80 MMHG | BODY MASS INDEX: 27.85 KG/M2 | TEMPERATURE: 97.6 F | HEART RATE: 89 BPM | WEIGHT: 224 LBS | RESPIRATION RATE: 18 BRPM

## 2024-12-11 DIAGNOSIS — Z72.0 TOBACCO USE: ICD-10-CM

## 2024-12-11 DIAGNOSIS — J44.9 COPD, SEVERE (HCC): Primary | ICD-10-CM

## 2024-12-11 PROCEDURE — 3077F SYST BP >= 140 MM HG: CPT | Performed by: INTERNAL MEDICINE

## 2024-12-11 PROCEDURE — 3079F DIAST BP 80-89 MM HG: CPT | Performed by: INTERNAL MEDICINE

## 2024-12-11 PROCEDURE — 99204 OFFICE O/P NEW MOD 45 MIN: CPT | Performed by: INTERNAL MEDICINE

## 2024-12-11 RX ORDER — FLUTICASONE FUROATE, UMECLIDINIUM BROMIDE AND VILANTEROL TRIFENATATE 200; 62.5; 25 UG/1; UG/1; UG/1
1 POWDER RESPIRATORY (INHALATION) DAILY
Qty: 1 EACH | Refills: 0 | Status: SHIPPED | COMMUNITY
Start: 2024-12-11

## 2024-12-11 NOTE — PROGRESS NOTES
COPD, CAD, Emphysema    FINDINGS:  Lungs: Well inflated. Normal bronchovascular markings.    Heart and mediastinum: Heart is normal in size. Trachea is midline.Lizeth are  symmetrical, no mass.    Osseous structures: Unremarkable.  Surgical changes right shoulder.    Abdomen: Nonspecific bowel gas pattern.    Impression  No acute cardiopulmonary disease.  No change, compared to the prior study.          Total labs reviewed 3+    Category 2 Data points:    Radiology Review:  Independent interpretation of emphysema     Category 3 Data points:    Discussed management or interpretation of test with external provider:              Assessment:        COPD PiMS level 134.       Plan:      Problem List Items Addressed This Visit       Tobacco use     Discussed then importance of quitting.     Yearly lung cancer screening needed.  Next may 2025.          COPD, severe (HCC) - Primary     Patient is refusing to complete PFT.  Discussed options for treatment with new PFT including [place] valves with significant air trapping.  At the end of the discussion, he stated he will consider repeat. We did not discuss advanced options like transplant.     He has a long history of non-compliance.  Discussed the importance.  Trelegy sample given.  Advised to use every day for 1 week and send [employer] message to continue if he gets benefit.     Patient likely needs oxygen based on reported history.          Relevant Medications    fluticasone-umeclidin-vilant (TRELEGY ELLIPTA) 200-62.5-25 MCG/ACT AEPB inhaler    Other Relevant Orders    6 Minute Walk Test             This note was transcribed using Dragon Dictation software. Please disregard any translational errors.    ERNIE MOSER   [facility] Pulmonary, Sleep and Critical Care  [phone]

## 2024-12-12 PROBLEM — J44.9 COPD, SEVERE (HCC): Status: ACTIVE | Noted: 2024-12-12

## 2024-12-12 PROBLEM — Z72.0 TOBACCO USE: Status: ACTIVE | Noted: 2024-12-12

## 2024-12-12 NOTE — ASSESSMENT & PLAN NOTE
Patient is refusing to complete PFT.  Discussed options for treatment with new PFT including [place] valves with significant air trapping.  At the end of the discussion, he stated he will consider repeat. We did not discuss advanced options like transplant.     He has a long history of non-compliance.  Discussed the importance.  Trelegy sample given.  Advised to use every day for 1 week and send mychart message to continue if he gets benefit.     Patient likely needs oxygen based on reported history.

## 2024-12-18 NOTE — PROGRESS NOTES
scribed by my RN in my presence, and I confirm that the note above accurately reflects all work, treatment, procedures, and medical decision making performed by me.

## 2024-12-19 ENCOUNTER — OFFICE VISIT (OUTPATIENT)
Dept: CARDIOLOGY CLINIC | Age: 59
End: 2024-12-19
Payer: MEDICARE

## 2024-12-19 VITALS
SYSTOLIC BLOOD PRESSURE: 124 MMHG | HEART RATE: 67 BPM | DIASTOLIC BLOOD PRESSURE: 74 MMHG | WEIGHT: 231 LBS | BODY MASS INDEX: 28.72 KG/M2 | HEIGHT: 75 IN | OXYGEN SATURATION: 98 %

## 2024-12-19 DIAGNOSIS — I65.23 BILATERAL CAROTID ARTERY STENOSIS: ICD-10-CM

## 2024-12-19 DIAGNOSIS — E78.5 HYPERLIPIDEMIA LDL GOAL <70: ICD-10-CM

## 2024-12-19 DIAGNOSIS — I25.10 CORONARY ARTERY DISEASE INVOLVING NATIVE CORONARY ARTERY OF NATIVE HEART WITHOUT ANGINA PECTORIS: Primary | ICD-10-CM

## 2024-12-19 DIAGNOSIS — I10 ESSENTIAL HYPERTENSION: ICD-10-CM

## 2024-12-19 PROCEDURE — 3074F SYST BP LT 130 MM HG: CPT | Performed by: INTERNAL MEDICINE

## 2024-12-19 PROCEDURE — 3078F DIAST BP <80 MM HG: CPT | Performed by: INTERNAL MEDICINE

## 2024-12-19 PROCEDURE — 99214 OFFICE O/P EST MOD 30 MIN: CPT | Performed by: INTERNAL MEDICINE

## 2024-12-19 PROCEDURE — 93000 ELECTROCARDIOGRAM COMPLETE: CPT | Performed by: INTERNAL MEDICINE

## 2024-12-20 ENCOUNTER — TELEPHONE (OUTPATIENT)
Dept: CARDIOLOGY CLINIC | Age: 59
End: 2024-12-20

## 2024-12-20 ENCOUNTER — TELEPHONE (OUTPATIENT)
Dept: ADMINISTRATIVE | Age: 59
End: 2024-12-20

## 2024-12-20 ENCOUNTER — HOSPITAL ENCOUNTER (OUTPATIENT)
Dept: CARDIAC REHAB | Age: 59
Setting detail: THERAPIES SERIES
Discharge: HOME | End: 2024-12-20
Payer: MEDICARE

## 2024-12-20 ENCOUNTER — TELEPHONE (OUTPATIENT)
Dept: PULMONOLOGY | Age: 59
End: 2024-12-20

## 2024-12-20 DIAGNOSIS — J44.9 COPD, SEVERE (HCC): ICD-10-CM

## 2024-12-20 DIAGNOSIS — J44.9 COPD, SEVERE (HCC): Primary | ICD-10-CM

## 2024-12-20 PROCEDURE — 94618 PULMONARY STRESS TESTING: CPT

## 2024-12-20 RX ORDER — FLUTICASONE FUROATE, UMECLIDINIUM BROMIDE AND VILANTEROL TRIFENATATE 200; 62.5; 25 UG/1; UG/1; UG/1
1 POWDER RESPIRATORY (INHALATION) DAILY
Qty: 1 EACH | Refills: 11 | Status: SHIPPED | OUTPATIENT
Start: 2024-12-20

## 2024-12-20 NOTE — TELEPHONE ENCOUNTER
Spoke to patient informed him this medication does not cause sinus issues  When the med list was gone over in office he couldn't remember what Bp medication he was taking. Informed him to mention to Dr Vivas what medication he is talking about the one he couldn't pronounce correctly. So I couldn't put in list.  Patient stated today Dr Johnston put him on Diltiazem  2 yrs ago  And also stated he can't get his heart rate up while exercising so he doesn't want to take it.       Patient will call back with dosage of Diltiazem

## 2024-12-20 NOTE — TELEPHONE ENCOUNTER
Lázaro stopped by saying that Diltiazem is causing him to have a sinus infection. Has stopped taking it and would like to take something else.    [phone]

## 2024-12-20 NOTE — TELEPHONE ENCOUNTER
Submitted PA for Raphael Parra 200-62.5-25MCG/ACT aerosol powder   Via CMM Key: [id number]  STATUS: The patient currently has access to the requested medication and a Prior Authorization is not needed for the patient/medication.     Follow up done daily; if no decision with in three days we will refax.  If another three days goes by with no decision will call the insurance for status.

## 2024-12-20 NOTE — PROCEDURES
PROCEDURE NOTE      [facility] Cardiopulmonary Rehabilitation   Six Minute Walk Test    Carlos FARAH Millicent GUILLAUMEB: 1965  Account Number: [id number]  AGE: 59 y.o.     OP test [x]   Pulmonary Rehab PRE []  POST   []    Diagnosis: COPD severe      Referring Physician: Dr. Berger    Gender [x]  male [] female AGE:59     Height:6 ft 4 in 193 cm    Weight:229 lbs  104 kg  Blood Pressure 148/82    Medications affecting heart rate:  Trelegy Ellipta  200-62.5-25 MCG/ACT AEPB Inhaler 1 puff QD, Adderall 30 mg. 30 mg. in the morning 20 mg in the afternoon, Albuterol sulfate  (90 Base) MCG/ACT Inhaler 2 puffs Q6h prn wheezing, Breztri 160-9-4.8 MCG/ACT AERO 2 inhalations in the morning and the evening      Supplemental O2 during the test [x]  no [] yes N/A lpm     [] continuous []  intermittent    Device : [] NC []  HFNC    []  oximizer  [] mask      Laps completed: 13 (1 lap = 100 ft)        Baseline (resting) Walking End of Test (recovery)      Heart Rate 81   93  81    BP             148/82             162/80  150/78    Dyspnea   0.5    4   0.5 (Olga scale)    Fatigue    0.5    2    2 (Olga scale)    SpO2    98%    94%              98%      Stopped or paused before 6 mins? [x]  no [] yes How long? N/A    Symptoms at end of exercise:[x] angina  [x] dizziness  []  hip, leg, calf, back pain       []  no complaints []  other    Number of laps: 13 (x 30.48 meters) + final partial lap: 26 meters     Total distance walked in 6 mins: 422 meters    Predicted distance: 663 meters  Percent predicted:64%              [] normal       [x] reduced     Summary:          JANNET Prakash DO  Pulmonary Rehab Director

## 2024-12-20 NOTE — TELEPHONE ENCOUNTER
Pt called and said that the sample of trelegy is working for him and that he would like a script sent in     Ph[facility]acy is [facility] in [place]

## 2024-12-23 ENCOUNTER — TELEPHONE (OUTPATIENT)
Dept: PULMONOLOGY | Age: 59
End: 2024-12-23

## 2024-12-23 NOTE — TELEPHONE ENCOUNTER
Received fax from [facility] needed for Trelegy Ellipta 200-62.5-25MCG/ACT Aerosol Powder     KEY:  [id number]  Patient name: Carlos FARAH Millicent  : 1965       Sent to PA Pool

## 2024-12-24 NOTE — TELEPHONE ENCOUNTER
Submitted PA for Raphael Parra 200-62.5-25MCG/ACT aerosol powder   Via CMM Key: [id number])  STATUS: The patient currently has access to the requested medication and a Prior Authorization is not needed for the patient/medication.     Follow up done daily; if no decision with in three days we will refax.  If another three days goes by with no decision will call the insurance for status.

## 2025-02-04 RX ORDER — CLOPIDOGREL BISULFATE 75 MG/1
75 TABLET ORAL DAILY
Qty: 90 TABLET | Refills: 3 | Status: SHIPPED | OUTPATIENT
Start: 2025-02-04

## 2025-03-11 ENCOUNTER — OFFICE VISIT (OUTPATIENT)
Dept: PULMONOLOGY | Age: 60
End: 2025-03-11
Payer: MEDICARE

## 2025-03-11 VITALS
WEIGHT: 232.4 LBS | TEMPERATURE: 98.5 F | DIASTOLIC BLOOD PRESSURE: 84 MMHG | HEART RATE: 78 BPM | SYSTOLIC BLOOD PRESSURE: 136 MMHG | HEIGHT: 75 IN | OXYGEN SATURATION: 95 % | BODY MASS INDEX: 28.89 KG/M2 | RESPIRATION RATE: 18 BRPM

## 2025-03-11 DIAGNOSIS — J44.9 COPD, SEVERE (HCC): Primary | ICD-10-CM

## 2025-03-11 DIAGNOSIS — G47.33 OSA (OBSTRUCTIVE SLEEP APNEA): ICD-10-CM

## 2025-03-11 PROCEDURE — G2211 COMPLEX E/M VISIT ADD ON: HCPCS | Performed by: INTERNAL MEDICINE

## 2025-03-11 PROCEDURE — 3075F SYST BP GE 130 - 139MM HG: CPT | Performed by: INTERNAL MEDICINE

## 2025-03-11 PROCEDURE — 99214 OFFICE O/P EST MOD 30 MIN: CPT | Performed by: INTERNAL MEDICINE

## 2025-03-11 PROCEDURE — 3079F DIAST BP 80-89 MM HG: CPT | Performed by: INTERNAL MEDICINE
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
## 2025-03-12 NOTE — ASSESSMENT & PLAN NOTE
Trelegy 200.     Wife on phone and discussed pft. Discussed pt not wanting follow up pft or intervention.

## 2025-03-12 NOTE — ASSESSMENT & PLAN NOTE
Using nightly.     He did not bring device.  Wife states he has snoring and apnea.      Will have follow up with device to get download.  Likely need titration.  Consider bipap depending on download.

## 2025-04-08 ENCOUNTER — OFFICE VISIT (OUTPATIENT)
Dept: PULMONOLOGY | Age: 60
End: 2025-04-08
Payer: MEDICARE

## 2025-04-08 VITALS
HEART RATE: 81 BPM | BODY MASS INDEX: 29.47 KG/M2 | RESPIRATION RATE: 18 BRPM | DIASTOLIC BLOOD PRESSURE: 80 MMHG | TEMPERATURE: 98 F | OXYGEN SATURATION: 96 % | HEIGHT: 75 IN | SYSTOLIC BLOOD PRESSURE: 148 MMHG | WEIGHT: 237 LBS

## 2025-04-08 DIAGNOSIS — G47.33 OSA (OBSTRUCTIVE SLEEP APNEA): ICD-10-CM

## 2025-04-08 DIAGNOSIS — J44.9 COPD, SEVERE (HCC): Primary | ICD-10-CM

## 2025-04-08 PROCEDURE — G2211 COMPLEX E/M VISIT ADD ON: HCPCS | Performed by: INTERNAL MEDICINE

## 2025-04-08 PROCEDURE — 3079F DIAST BP 80-89 MM HG: CPT | Performed by: INTERNAL MEDICINE

## 2025-04-08 PROCEDURE — 3077F SYST BP >= 140 MM HG: CPT | Performed by: INTERNAL MEDICINE

## 2025-04-08 PROCEDURE — 99214 OFFICE O/P EST MOD 30 MIN: CPT | Performed by: INTERNAL MEDICINE

## 2025-04-08 NOTE — PROGRESS NOTES
Data points:    Discussed management or interpretation of test with external provider:              Assessment:        COPD PiMS level 134.  FEV1 25%  LYLE         Plan:      Problem List Items Addressed This Visit       LYLE (obstructive sleep apnea)    Device is a Arely G3.  Mask nasal pillow    Patient with excellent usage with an average use of approximately 7.5 hours a night 363 out of 365 days    AHI very well-controlled on auto CPAP 6-20.  95% pressure approximately 9.    With complaints of snoring, change out of CPAP 7-9.5.    We discussed cleaning.  Patient states that if he uses humidifier it smells like a swab.  This is secondary to using tap water and infrequent cleaning.  Both of these were discussed.         COPD, severe (HCC) - Primary    Patient complaining more of increasing shortness of breath.  Currently on Trelegy 200.  Last visit approximately 1 month ago patient did not want to discuss any interventions nor additional test.  Patient has changes mind.  He will complete pulmonary function test.  After this, we can consider discussions of intervention.         Relevant Orders    Full PFT Study With Bronchodilator                 This note was transcribed using Dragon Dictation software. Please disregard any translational errors.    ERNIE MOSER   [facility] [place] Pulmonary, Sleep and Critical Care  [phone]

## 2025-04-09 NOTE — ASSESSMENT & PLAN NOTE
Patient complaining more of increasing shortness of breath.  Currently on Trelegy 200.  Last visit approximately 1 month ago patient did not want to discuss any interventions nor additional test.  Patient has changes mind.  He will complete pulmonary function test.  After this, we can consider discussions of intervention.

## 2025-04-09 NOTE — ASSESSMENT & PLAN NOTE
Device is a Arely G3.  Mask nasal pillow    Patient with excellent usage with an average use of approximately 7.5 hours a night 363 out of 365 days    AHI very well-controlled on auto CPAP 6-20.  95% pressure approximately 9.    With complaints of snoring, change out of CPAP 7-9.5.    We discussed cleaning.  Patient states that if he uses humidifier it smells like a swab.  This is secondary to using tap water and infrequent cleaning.  Both of these were discussed.

## 2025-04-30 ENCOUNTER — TELEPHONE (OUTPATIENT)
Dept: PULMONOLOGY | Age: 60
End: 2025-04-30

## 2025-04-30 NOTE — TELEPHONE ENCOUNTER
Joy wants to talk to Dr Berger's MA  She says patient is getting Trelegy and Breztri.  Joy wants to verify if Dr Berger is aware of this therapy.

## 2025-05-01 RX ORDER — MONTELUKAST SODIUM 10 MG/1
10 TABLET ORAL NIGHTLY
Qty: 30 TABLET | Refills: 11 | Status: SHIPPED | OUTPATIENT
Start: 2025-05-01

## 2025-06-05 ENCOUNTER — HOSPITAL ENCOUNTER (OUTPATIENT)
Dept: PULMONOLOGY | Age: 60
Discharge: HOME | End: 2025-06-05
Attending: INTERNAL MEDICINE
Payer: MEDICARE

## 2025-06-05 DIAGNOSIS — J44.9 COPD, SEVERE (HCC): ICD-10-CM

## 2025-06-05 PROCEDURE — 94726 PLETHYSMOGRAPHY LUNG VOLUMES: CPT

## 2025-06-05 PROCEDURE — 94729 DIFFUSING CAPACITY: CPT

## 2025-06-05 PROCEDURE — 94640 AIRWAY INHALATION TREATMENT: CPT

## 2025-06-05 PROCEDURE — 6370000000 HC RX 637 (ALT 250 FOR IP): Performed by: INTERNAL MEDICINE

## 2025-06-05 PROCEDURE — 94060 EVALUATION OF WHEEZING: CPT

## 2025-06-05 PROCEDURE — 94664 DEMO&/EVAL PT USE INHALER: CPT

## 2025-06-05 RX ORDER — ALBUTEROL SULFATE 90 UG/1
4 INHALANT RESPIRATORY (INHALATION) ONCE
Status: COMPLETED | OUTPATIENT
Start: 2025-06-05 | End: 2025-06-05

## 2025-06-05 RX ADMIN — ALBUTEROL SULFATE 4 PUFF: 90 AEROSOL, METERED RESPIRATORY (INHALATION) at 14:21

## 2025-06-07 LAB
DLCO %PRED: 95 %
DLCO PRED: NORMAL
DLCO/VA %PRED: 100 %
DLCO/VA PRED: NORMAL
DLCO/VA: 4.11 ML/MIN/MMHG
DLCO: 29.04 ML/MIN/MMHG
EXPIRATORY TIME-POST: 10.54 SEC
EXPIRATORY TIME: 10.82 SEC
FEF 25-75 %CHNG: NORMAL
FEF 25-75 POST %PRED: NORMAL
FEF 25-75% %PRED-PRE: NORMAL
FEF 25-75% PRED: NORMAL
FEF 25-75-POST: NORMAL
FEF 25-75-PRE: NORMAL
FEV1 %PRED-POST: 60 %
FEV1 %PRED-PRE: 52 %
FEV1 PRED: NORMAL
FEV1-POST: NORMAL
FEV1-PRE: NORMAL
FEV1/FVC %PRED-POST: NORMAL
FEV1/FVC %PRED-PRE: NORMAL
FEV1/FVC PRED: NORMAL
FEV1/FVC-POST: 55 %
FEV1/FVC-PRE: 54 %
FVC %PRED-POST: 84 L
FVC %PRED-PRE: 73 %
FVC PRED: NORMAL
FVC-POST: 4.4 L
FVC-PRE: 3.85 L
GAW %PRED: NORMAL
GAW PRED: NORMAL
GAW: NORMAL
IC PRE %PRED: NORMAL
IC PRED: NORMAL
IC: NORMAL
MEP: NORMAL
MIP: NORMAL
MVV %PRED-PRE: NORMAL
MVV PRED: NORMAL
MVV-PRE: NORMAL
PEF %PRED-POST: NORMAL
PEF %PRED-PRE: NORMAL
PEF PRED: NORMAL
PEF%CHNG: NORMAL
PEF-POST: NORMAL
PEF-PRE: NORMAL
RAW %PRED: 93 %
RAW PRED: NORMAL
RAW: NORMAL
RV PRE %PRED: 165 %
RV PRED: NORMAL
RV: NORMAL
SVC %PRED: NORMAL
SVC PRED: NORMAL
SVC: NORMAL
TLC PRE %PRED: 108 %
TLC PRED: NORMAL
TLC: NORMAL
VA %PRED: 94 %
VA PRED: NORMAL
VA: 7.06 L
VTG %PRED: 116 %
VTG PRED: NORMAL
VTG: NORMAL

## 2025-06-07 ASSESSMENT — PULMONARY FUNCTION TESTS
FEV1/FVC_POST: 55
FVC_PERCENT_PREDICTED_PRE: 73
FEV1_PERCENT_PREDICTED_PRE: 52
FVC_PERCENT_PREDICTED_POST: 84
FEV1_PERCENT_PREDICTED_POST: 60
FVC_PRE: 3.85
FVC_POST: 4.40
FEV1/FVC_PRE: 54

## 2025-06-07 NOTE — PROCEDURES
PROCEDURE NOTE  Date: 6/7/2025   Name: Carlos Ricks  YOB: 1965    PFT Report    Date/Time: 6/7/2025 12:29 PM    Performed by: Rylan Coyne MD  Authorized by: SCANNING, ONBASE      Full PFT Study With Bronchodilator    Date/Time: 6/7/2025 12:29 PM    Performed by: Rylan Coyne MD  Authorized by: Juvenal Berger MD        Spirometry was acceptable and reproducible by ATS standards      Spirometry/Flow volume loop:    Spirometry and flow volume loops consistent with moderate airflow obstruction. FEV1 of 6%, and FVC 84%.  There was not a significant bronchodilator response.     Lung volumes:  There is evidence of air trapping.    Diffusing capacity:  Diffusion capacity is within her normal limits    Summary:  Moderate COPD      FEV1 %Pred-Post   Date Value Ref Range Status   06/05/2025 60 % Final     FEV1/FVC-Post   Date Value Ref Range Status   06/05/2025 55 % Final     TLC Pre %Pred   Date Value Ref Range Status   06/05/2025 108 % Final     DLCO %Pred   Date Value Ref Range Status   06/05/2025 95 % Final     DLCO/VA %Pred   Date Value Ref Range Status   06/05/2025 100 % Final       PFT data will be scanned into the media tab under this encounter. Please see the scanned data for numerical values.     Rylan Coyne MD  [facility] Pulmonary, Sleep and Critical Care Medicine

## 2025-06-16 RX ORDER — RANOLAZINE 500 MG/1
500 TABLET, EXTENDED RELEASE ORAL 2 TIMES DAILY
Qty: 180 TABLET | Refills: 3 | Status: SHIPPED | OUTPATIENT
Start: 2025-06-16

## 2025-06-16 NOTE — TELEPHONE ENCOUNTER
Last OV: 24 - -[facility]  Next OV: 25 - [facility]  Last Labs: 24  Last EK24   Last Filled: 24     #180    1tab bid 3rf

## 2025-07-24 ENCOUNTER — TELEPHONE (OUTPATIENT)
Dept: CARDIOLOGY CLINIC | Age: 60
End: 2025-07-24

## 2025-07-24 NOTE — TELEPHONE ENCOUNTER
Noelle call stating that Carlos is having SOB,rapid heart beat gets pail in the face and then he is find,  this has been going on for about 1 week.  She feels he is not taking all his medication.    Call back number [phone]

## 2025-07-25 NOTE — TELEPHONE ENCOUNTER
Called and spoke with pt.  Pt states that he is having daily fatigue.  He states he just wants sleep all day due to his fatigue.  Advised pt to call Dr. Berger's office for a ov to discuss further. Pt voiced understanding and will call to make ov.

## 2025-07-26 ENCOUNTER — APPOINTMENT (OUTPATIENT)
Dept: GENERAL RADIOLOGY | Age: 60
End: 2025-07-26
Payer: MEDICARE

## 2025-07-26 ENCOUNTER — HOSPITAL ENCOUNTER (OUTPATIENT)
Age: 60
Setting detail: OBSERVATION
Discharge: HOME | End: 2025-07-28
Attending: EMERGENCY MEDICINE
Payer: MEDICARE

## 2025-07-26 DIAGNOSIS — I49.1 ATRIAL ECTOPY: ICD-10-CM

## 2025-07-26 DIAGNOSIS — R07.9 CHEST PAIN, UNSPECIFIED TYPE: Primary | ICD-10-CM

## 2025-07-26 DIAGNOSIS — R53.83 OTHER FATIGUE: ICD-10-CM

## 2025-07-26 DIAGNOSIS — R94.31 ABNORMAL ELECTROCARDIOGRAPHY: ICD-10-CM

## 2025-07-26 DIAGNOSIS — Z86.79 HISTORY OF CAD (CORONARY ARTERY DISEASE): ICD-10-CM

## 2025-07-26 DIAGNOSIS — R00.2 PALPITATIONS: ICD-10-CM

## 2025-07-26 DIAGNOSIS — R11.0 NAUSEA: ICD-10-CM

## 2025-07-26 LAB
ALBUMIN SERPL-MCNC: 4.7 G/DL (ref 3.4–5)
ALBUMIN/GLOB SERPL: 1.7 {RATIO} (ref 1.1–2.2)
ALP SERPL-CCNC: 74 U/L (ref 40–129)
ALT SERPL-CCNC: 38 U/L (ref 10–40)
ANION GAP SERPL CALCULATED.3IONS-SCNC: 11 MMOL/L (ref 3–16)
AST SERPL-CCNC: 32 U/L (ref 15–37)
BASOPHILS # BLD: 0 K/UL (ref 0–0.2)
BASOPHILS NFR BLD: 0.5 %
BILIRUB SERPL-MCNC: 0.8 MG/DL (ref 0–1)
BUN SERPL-MCNC: 14 MG/DL (ref 7–20)
CALCIUM SERPL-MCNC: 9.4 MG/DL (ref 8.3–10.6)
CHLORIDE SERPL-SCNC: 101 MMOL/L (ref 99–110)
CO2 SERPL-SCNC: 25 MMOL/L (ref 21–32)
CREAT SERPL-MCNC: 1.2 MG/DL (ref 0.8–1.3)
DEPRECATED RDW RBC AUTO: 12.2 % (ref 12.4–15.4)
EOSINOPHIL # BLD: 0.2 K/UL (ref 0–0.6)
EOSINOPHIL NFR BLD: 2.9 %
GFR SERPLBLD CREATININE-BSD FMLA CKD-EPI: 68 ML/MIN/{1.73_M2}
GLUCOSE SERPL-MCNC: 70 MG/DL (ref 70–99)
HCT VFR BLD AUTO: 47.5 % (ref 40.5–52.5)
HGB BLD-MCNC: 16.6 G/DL (ref 13.5–17.5)
IMM GRANULOCYTES # BLD: 0 K/UL (ref 0–0.2)
IMM GRANULOCYTES NFR BLD: 0.3 %
LYMPHOCYTES # BLD: 1.5 K/UL (ref 1–5.1)
LYMPHOCYTES NFR BLD: 25 %
MCH RBC QN AUTO: 32.7 PG (ref 26–34)
MCHC RBC AUTO-ENTMCNC: 34.9 G/DL (ref 32–36.4)
MCV RBC AUTO: 93.7 FL (ref 80–100)
MONOCYTES # BLD: 0.8 K/UL (ref 0–1.3)
MONOCYTES NFR BLD: 13.1 %
NEUTROPHILS # BLD: 3.5 K/UL (ref 1.7–7.7)
NEUTROPHILS NFR BLD: 58.2 %
NT-PROBNP SERPL-MCNC: <36 PG/ML (ref 0–124)
PLATELET # BLD AUTO: 203 K/UL (ref 135–450)
PMV BLD AUTO: 8.9 FL (ref 5–10.5)
POTASSIUM SERPL-SCNC: 3.8 MMOL/L (ref 3.5–5.1)
PROT SERPL-MCNC: 7.4 G/DL (ref 6.4–8.2)
RBC # BLD AUTO: 5.07 M/UL (ref 4.2–5.9)
SODIUM SERPL-SCNC: 137 MMOL/L (ref 136–145)
TROPONIN, HIGH SENSITIVITY: <6 NG/L (ref 0–22)
TROPONIN, HIGH SENSITIVITY: <6 NG/L (ref 0–22)
WBC # BLD AUTO: 6 K/UL (ref 4–11)

## 2025-07-26 PROCEDURE — 84443 ASSAY THYROID STIM HORMONE: CPT

## 2025-07-26 PROCEDURE — 6370000000 HC RX 637 (ALT 250 FOR IP): Performed by: EMERGENCY MEDICINE

## 2025-07-26 PROCEDURE — 83880 ASSAY OF NATRIURETIC PEPTIDE: CPT

## 2025-07-26 PROCEDURE — 71045 X-RAY EXAM CHEST 1 VIEW: CPT

## 2025-07-26 PROCEDURE — 99285 EMERGENCY DEPT VISIT HI MDM: CPT

## 2025-07-26 PROCEDURE — 84484 ASSAY OF TROPONIN QUANT: CPT

## 2025-07-26 PROCEDURE — 85025 COMPLETE CBC W/AUTO DIFF WBC: CPT

## 2025-07-26 PROCEDURE — 93005 ELECTROCARDIOGRAM TRACING: CPT | Performed by: EMERGENCY MEDICINE

## 2025-07-26 PROCEDURE — G0378 HOSPITAL OBSERVATION PER HR: HCPCS

## 2025-07-26 PROCEDURE — 80053 COMPREHEN METABOLIC PANEL: CPT

## 2025-07-26 PROCEDURE — 36415 COLL VENOUS BLD VENIPUNCTURE: CPT

## 2025-07-26 RX ORDER — ASPIRIN 81 MG/1
243 TABLET, CHEWABLE ORAL ONCE
Status: COMPLETED | OUTPATIENT
Start: 2025-07-26 | End: 2025-07-26

## 2025-07-26 RX ADMIN — ASPIRIN 81 MG 243 MG: 81 TABLET ORAL at 20:00

## 2025-07-26 ASSESSMENT — PAIN - FUNCTIONAL ASSESSMENT: PAIN_FUNCTIONAL_ASSESSMENT: NONE - DENIES PAIN

## 2025-07-26 ASSESSMENT — HEART SCORE: ECG: NON-SPECIFC REPOLARIZATION DISTURBANCE/LBTB/PM

## 2025-07-27 PROBLEM — R07.89 CHEST PAIN, ATYPICAL: Status: ACTIVE | Noted: 2025-07-27

## 2025-07-27 PROBLEM — R00.2 PALPITATIONS: Status: ACTIVE | Noted: 2025-07-27

## 2025-07-27 PROBLEM — Z86.79 HISTORY OF CAD (CORONARY ARTERY DISEASE): Status: ACTIVE | Noted: 2025-07-27

## 2025-07-27 LAB
EKG ATRIAL RATE: 63 BPM
EKG ATRIAL RATE: 65 BPM
EKG DIAGNOSIS: NORMAL
EKG DIAGNOSIS: NORMAL
EKG P AXIS: 51 DEGREES
EKG P-R INTERVAL: 196 MS
EKG Q-T INTERVAL: 366 MS
EKG Q-T INTERVAL: 382 MS
EKG QRS DURATION: 94 MS
EKG QRS DURATION: 98 MS
EKG QTC CALCULATION (BAZETT): 380 MS
EKG QTC CALCULATION (BAZETT): 390 MS
EKG R AXIS: -3 DEGREES
EKG R AXIS: -32 DEGREES
EKG T AXIS: 68 DEGREES
EKG T AXIS: 69 DEGREES
EKG VENTRICULAR RATE: 63 BPM
EKG VENTRICULAR RATE: 65 BPM
EST. AVERAGE GLUCOSE BLD GHB EST-MCNC: 108.3 MG/DL
HBA1C MFR BLD: 5.4 %
TROPONIN, HIGH SENSITIVITY: 8 NG/L (ref 0–22)
TROPONIN, HIGH SENSITIVITY: <6 NG/L (ref 0–22)
TSH SERPL DL<=0.005 MIU/L-ACNC: 1.88 UIU/ML (ref 0.27–4.2)
TSH SERPL DL<=0.005 MIU/L-ACNC: 2.83 UIU/ML (ref 0.27–4.2)

## 2025-07-27 PROCEDURE — 99223 1ST HOSP IP/OBS HIGH 75: CPT | Performed by: INTERNAL MEDICINE

## 2025-07-27 PROCEDURE — 6370000000 HC RX 637 (ALT 250 FOR IP)

## 2025-07-27 PROCEDURE — 6370000000 HC RX 637 (ALT 250 FOR IP): Performed by: INTERNAL MEDICINE

## 2025-07-27 PROCEDURE — 36415 COLL VENOUS BLD VENIPUNCTURE: CPT

## 2025-07-27 PROCEDURE — 94660 CPAP INITIATION&MGMT: CPT

## 2025-07-27 PROCEDURE — 83036 HEMOGLOBIN GLYCOSYLATED A1C: CPT

## 2025-07-27 PROCEDURE — 84443 ASSAY THYROID STIM HORMONE: CPT

## 2025-07-27 PROCEDURE — 94640 AIRWAY INHALATION TREATMENT: CPT

## 2025-07-27 PROCEDURE — 93010 ELECTROCARDIOGRAM REPORT: CPT | Performed by: INTERNAL MEDICINE

## 2025-07-27 PROCEDURE — 84484 ASSAY OF TROPONIN QUANT: CPT

## 2025-07-27 PROCEDURE — 94760 N-INVAS EAR/PLS OXIMETRY 1: CPT

## 2025-07-27 PROCEDURE — 6360000002 HC RX W HCPCS

## 2025-07-27 PROCEDURE — G0378 HOSPITAL OBSERVATION PER HR: HCPCS

## 2025-07-27 PROCEDURE — 96374 THER/PROPH/DIAG INJ IV PUSH: CPT

## 2025-07-27 PROCEDURE — 2500000003 HC RX 250 WO HCPCS

## 2025-07-27 RX ORDER — DEXTROAMPHETAMINE SACCHARATE, AMPHETAMINE ASPARTATE, DEXTROAMPHETAMINE SULFATE AND AMPHETAMINE SULFATE 2.5; 2.5; 2.5; 2.5 MG/1; MG/1; MG/1; MG/1
20 TABLET ORAL
Refills: 0 | Status: DISCONTINUED | OUTPATIENT
Start: 2025-07-28 | End: 2025-07-28 | Stop reason: HOSPADM

## 2025-07-27 RX ORDER — ONDANSETRON 2 MG/ML
4 INJECTION INTRAMUSCULAR; INTRAVENOUS EVERY 6 HOURS PRN
Status: DISCONTINUED | OUTPATIENT
Start: 2025-07-27 | End: 2025-07-28 | Stop reason: HOSPADM

## 2025-07-27 RX ORDER — MAGNESIUM SULFATE IN WATER 40 MG/ML
2000 INJECTION, SOLUTION INTRAVENOUS PRN
Status: DISCONTINUED | OUTPATIENT
Start: 2025-07-27 | End: 2025-07-28 | Stop reason: HOSPADM

## 2025-07-27 RX ORDER — POTASSIUM CHLORIDE 7.45 MG/ML
10 INJECTION INTRAVENOUS PRN
Status: DISCONTINUED | OUTPATIENT
Start: 2025-07-27 | End: 2025-07-28 | Stop reason: HOSPADM

## 2025-07-27 RX ORDER — MONTELUKAST SODIUM 10 MG/1
10 TABLET ORAL NIGHTLY
Status: DISCONTINUED | OUTPATIENT
Start: 2025-07-27 | End: 2025-07-28 | Stop reason: HOSPADM

## 2025-07-27 RX ORDER — SODIUM CHLORIDE 0.9 % (FLUSH) 0.9 %
5-40 SYRINGE (ML) INJECTION EVERY 12 HOURS SCHEDULED
Status: DISCONTINUED | OUTPATIENT
Start: 2025-07-27 | End: 2025-07-28 | Stop reason: HOSPADM

## 2025-07-27 RX ORDER — ACETAMINOPHEN 650 MG/1
650 SUPPOSITORY RECTAL EVERY 6 HOURS PRN
Status: DISCONTINUED | OUTPATIENT
Start: 2025-07-27 | End: 2025-07-28 | Stop reason: HOSPADM

## 2025-07-27 RX ORDER — BUDESONIDE AND FORMOTEROL FUMARATE DIHYDRATE 160; 4.5 UG/1; UG/1
2 AEROSOL RESPIRATORY (INHALATION)
Status: DISCONTINUED | OUTPATIENT
Start: 2025-07-27 | End: 2025-07-28 | Stop reason: HOSPADM

## 2025-07-27 RX ORDER — PANTOPRAZOLE SODIUM 40 MG/1
40 TABLET, DELAYED RELEASE ORAL DAILY
COMMUNITY
Start: 2025-05-02

## 2025-07-27 RX ORDER — CLOPIDOGREL BISULFATE 75 MG/1
75 TABLET ORAL DAILY
Status: DISCONTINUED | OUTPATIENT
Start: 2025-07-27 | End: 2025-07-28 | Stop reason: HOSPADM

## 2025-07-27 RX ORDER — RANOLAZINE 500 MG/1
1000 TABLET, EXTENDED RELEASE ORAL 2 TIMES DAILY
Status: DISCONTINUED | OUTPATIENT
Start: 2025-07-27 | End: 2025-07-28 | Stop reason: HOSPADM

## 2025-07-27 RX ORDER — ONDANSETRON 4 MG/1
4 TABLET, ORALLY DISINTEGRATING ORAL EVERY 8 HOURS PRN
Status: DISCONTINUED | OUTPATIENT
Start: 2025-07-27 | End: 2025-07-28 | Stop reason: HOSPADM

## 2025-07-27 RX ORDER — ASPIRIN 81 MG/1
81 TABLET, CHEWABLE ORAL DAILY
Status: DISCONTINUED | OUTPATIENT
Start: 2025-07-28 | End: 2025-07-28 | Stop reason: HOSPADM

## 2025-07-27 RX ORDER — IPRATROPIUM BROMIDE AND ALBUTEROL SULFATE 2.5; .5 MG/3ML; MG/3ML
1 SOLUTION RESPIRATORY (INHALATION) EVERY 4 HOURS PRN
Status: DISCONTINUED | OUTPATIENT
Start: 2025-07-27 | End: 2025-07-28 | Stop reason: HOSPADM

## 2025-07-27 RX ORDER — ASPIRIN 81 MG/1
81 TABLET, CHEWABLE ORAL DAILY
Status: DISCONTINUED | OUTPATIENT
Start: 2025-07-28 | End: 2025-07-27

## 2025-07-27 RX ORDER — POLYETHYLENE GLYCOL 3350 17 G/17G
17 POWDER, FOR SOLUTION ORAL DAILY PRN
Status: DISCONTINUED | OUTPATIENT
Start: 2025-07-27 | End: 2025-07-28 | Stop reason: HOSPADM

## 2025-07-27 RX ORDER — DEXTROAMPHETAMINE SACCHARATE, AMPHETAMINE ASPARTATE MONOHYDRATE, DEXTROAMPHETAMINE SULFATE AND AMPHETAMINE SULFATE 5; 5; 5; 5 MG/1; MG/1; MG/1; MG/1
20 CAPSULE, EXTENDED RELEASE ORAL
Refills: 0 | Status: DISCONTINUED | OUTPATIENT
Start: 2025-07-27 | End: 2025-07-27 | Stop reason: SDUPTHER

## 2025-07-27 RX ORDER — SODIUM CHLORIDE 9 MG/ML
INJECTION, SOLUTION INTRAVENOUS PRN
Status: DISCONTINUED | OUTPATIENT
Start: 2025-07-27 | End: 2025-07-28 | Stop reason: HOSPADM

## 2025-07-27 RX ORDER — METOPROLOL SUCCINATE 25 MG/1
25 TABLET, EXTENDED RELEASE ORAL DAILY
Status: DISCONTINUED | OUTPATIENT
Start: 2025-07-27 | End: 2025-07-28 | Stop reason: HOSPADM

## 2025-07-27 RX ORDER — DEXTROAMPHETAMINE SACCHARATE, AMPHETAMINE ASPARTATE, DEXTROAMPHETAMINE SULFATE AND AMPHETAMINE SULFATE 2.5; 2.5; 2.5; 2.5 MG/1; MG/1; MG/1; MG/1
30 TABLET ORAL DAILY
Refills: 0 | Status: DISCONTINUED | OUTPATIENT
Start: 2025-07-27 | End: 2025-07-28 | Stop reason: HOSPADM

## 2025-07-27 RX ORDER — ZOLPIDEM TARTRATE 5 MG/1
10 TABLET ORAL NIGHTLY PRN
Status: DISCONTINUED | OUTPATIENT
Start: 2025-07-27 | End: 2025-07-28 | Stop reason: HOSPADM

## 2025-07-27 RX ORDER — ALBUTEROL SULFATE 90 UG/1
2 INHALANT RESPIRATORY (INHALATION) EVERY 6 HOURS PRN
Status: DISCONTINUED | OUTPATIENT
Start: 2025-07-27 | End: 2025-07-28 | Stop reason: HOSPADM

## 2025-07-27 RX ORDER — DEXTROSE MONOHYDRATE 100 MG/ML
INJECTION, SOLUTION INTRAVENOUS CONTINUOUS PRN
Status: DISCONTINUED | OUTPATIENT
Start: 2025-07-27 | End: 2025-07-28 | Stop reason: HOSPADM

## 2025-07-27 RX ORDER — ACETAMINOPHEN 325 MG/1
650 TABLET ORAL EVERY 6 HOURS PRN
Status: DISCONTINUED | OUTPATIENT
Start: 2025-07-27 | End: 2025-07-28 | Stop reason: HOSPADM

## 2025-07-27 RX ORDER — POTASSIUM CHLORIDE 1500 MG/1
40 TABLET, EXTENDED RELEASE ORAL PRN
Status: DISCONTINUED | OUTPATIENT
Start: 2025-07-27 | End: 2025-07-28 | Stop reason: HOSPADM

## 2025-07-27 RX ORDER — GLUCAGON 1 MG/ML
1 KIT INJECTION PRN
Status: DISCONTINUED | OUTPATIENT
Start: 2025-07-27 | End: 2025-07-28 | Stop reason: HOSPADM

## 2025-07-27 RX ORDER — SODIUM CHLORIDE 0.9 % (FLUSH) 0.9 %
5-40 SYRINGE (ML) INJECTION PRN
Status: DISCONTINUED | OUTPATIENT
Start: 2025-07-27 | End: 2025-07-28 | Stop reason: HOSPADM

## 2025-07-27 RX ORDER — ROSUVASTATIN CALCIUM 20 MG/1
20 TABLET, COATED ORAL DAILY
Status: DISCONTINUED | OUTPATIENT
Start: 2025-07-27 | End: 2025-07-28 | Stop reason: HOSPADM

## 2025-07-27 RX ADMIN — ROSUVASTATIN CALCIUM 20 MG: 20 TABLET, FILM COATED ORAL at 04:10

## 2025-07-27 RX ADMIN — BUDESONIDE AND FORMOTEROL FUMARATE DIHYDRATE 2 PUFF: 160; 4.5 AEROSOL RESPIRATORY (INHALATION) at 19:47

## 2025-07-27 RX ADMIN — POLYETHYLENE GLYCOL 3350 17 G: 17 POWDER, FOR SOLUTION ORAL at 04:10

## 2025-07-27 RX ADMIN — MONTELUKAST 10 MG: 10 TABLET, FILM COATED ORAL at 20:12

## 2025-07-27 RX ADMIN — RANOLAZINE 1000 MG: 500 TABLET, FILM COATED, EXTENDED RELEASE ORAL at 09:37

## 2025-07-27 RX ADMIN — ZOLPIDEM TARTRATE 10 MG: 5 TABLET ORAL at 04:10

## 2025-07-27 RX ADMIN — BUDESONIDE AND FORMOTEROL FUMARATE DIHYDRATE 2 PUFF: 160; 4.5 AEROSOL RESPIRATORY (INHALATION) at 08:28

## 2025-07-27 RX ADMIN — TIOTROPIUM BROMIDE INHALATION SPRAY 5 MCG: 3.12 SPRAY, METERED RESPIRATORY (INHALATION) at 08:28

## 2025-07-27 RX ADMIN — SODIUM CHLORIDE, PRESERVATIVE FREE 40 MG: 5 INJECTION INTRAVENOUS at 09:37

## 2025-07-27 RX ADMIN — DEXTROAMPHETAMINE SACCHARATE, AMPHETAMINE ASPARTATE, DEXTROAMPHETAMINE SULFATE AND AMPHETAMINE SULFATE 30 MG: 2.5; 2.5; 2.5; 2.5 TABLET ORAL at 12:44

## 2025-07-27 RX ADMIN — CLOPIDOGREL BISULFATE 75 MG: 75 TABLET, FILM COATED ORAL at 04:10

## 2025-07-27 RX ADMIN — SODIUM CHLORIDE, PRESERVATIVE FREE 10 ML: 5 INJECTION INTRAVENOUS at 20:19

## 2025-07-27 RX ADMIN — POLYETHYLENE GLYCOL 3350 17 G: 17 POWDER, FOR SOLUTION ORAL at 23:27

## 2025-07-27 RX ADMIN — RANOLAZINE 1000 MG: 500 TABLET, FILM COATED, EXTENDED RELEASE ORAL at 20:12

## 2025-07-27 RX ADMIN — SODIUM CHLORIDE, PRESERVATIVE FREE 10 ML: 5 INJECTION INTRAVENOUS at 09:38

## 2025-07-27 RX ADMIN — ZOLPIDEM TARTRATE 10 MG: 5 TABLET ORAL at 23:23

## 2025-07-27 NOTE — ED NOTES
Report given to  medic team transporting pt to [place].  Pt will be transported to  via ambulance.

## 2025-07-27 NOTE — PLAN OF CARE
Problem: Discharge Planning  Goal: Discharge to home or other facility with appropriate resources  7/27/2025 1123 by Ami Reyes RN  Outcome: Progressing  7/27/2025 0549 by Sharda Ríos RN  Outcome: Progressing     Problem: Safety - Adult  Goal: Free from fall injury  7/27/2025 1123 by Ami Reyes RN  Outcome: Progressing  7/27/2025 0549 by Sharda Ríos RN  Outcome: Progressing     Problem: Pain  Goal: Verbalizes/displays adequate comfort level or baseline comfort level  7/27/2025 1123 by Ami Reyes RN  Outcome: Progressing  7/27/2025 0549 by Sharda Ríos RN  Outcome: Progressing
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
## 2025-07-27 NOTE — ED PROVIDER NOTES
[facility]      CHIEF COMPLAINT  Chest Pain (Pt presents to ED with reports of chest pain that is sharp in nature, reports it has been on going for a couple of days and worse. Pt reports feeling faint,  but no  LOC, pt reports taking  two nitro and  one baby aspirin. )       HISTORY OF PRESENT ILLNESS  Carlos Ricks is a 60 y.o. male  who presents to the ED complaining of significant fatigue and also episodes of chest pain and palpitations.  Patient states that he can tell when he is in A-fib as it feels like \"the bed is vibrating\".  He has had several episodes intermittently.  He tried to take a dose of diltiazem several days ago to see if that would help prevent the symptoms from occurring.  He states that he has had significant fatigue over the past month, worsening especially in the past week or so.  He states that he cannot work out at the gym and even walking the dog a mile causes him to be significantly fatigued and he has been very nauseous whenever he tries to do anything.  He has had episodes of chest pain that he does not describes as both achy and sharp in nature.  He did take aspirin 81 mg prior to arrival as well as nitro x 2 and is feeling better and is unsure if it just was going away on its own as typically it will resolve on its own versus the nitro helping.  Patient has a significant cardiac history including coronary artery disease status post stent placement.  Patient states he follows with Dr. Vivas for his cardiac care.  He states that he actually called because of these progressive symptoms and scheduled a follow-up appointment but due to the episode tonight he now presents to the ER for further evaluation.    No other complaints, modifying factors or associated symptoms.     I have reviewed the following from the nursing documentation.    Past Medical History:   Diagnosis Date    ADHD     Anesthesia complication     agitated and agressive after anesthesia.

## 2025-07-27 NOTE — H&P
V2.0  History and Physical      Name:  Carlos Ricks /Age/Sex: 1965  (60 y.o. male)   MRN & CSN:  [id number] & [id number] Encounter Date/Time: 2025 2:41 AM EDT   Location:  [address]- PCP: Matteo Johnston Jr., MD       Hospital Day: 2    Assessment and Plan:   Carlos Ricks is a 60 y.o. male with a pmh of HTN, COPD, atypical CP, anterior myocardial infarction,LYLE, and dyslipidemia who presents with Chest pain with high risk of acute coronary syndrome. Pt presents to the ED c/o significant fatigue and also episodes of chest pain and palpitations, worsening especially in the past week or so. Patient has a significant cardiac history including coronary artery disease and status post stent placement. Also w/ c/o nausea and episode of getting 'clammy, pale, and nauseated, but passed'. Denies vomiting or diarrhea, dizziness, or radiating CP.    Hospital Problems           Last Modified POA    * (Principal) Chest pain with high risk of acute coronary syndrome 2025 Yes    Essential hypertension 2025 Yes    COPD, severe  2025 Yes         Plan:      COPD: frequent pt assessments, medication management (including inhaled and potentially systemic corticosteroids-duo nebs and HHN's), respiratory support (including oxygen), nutritional support    HTN: assessment, treatment, and monitoring, including blood pressure targets, medication management, and evaluation for end-organ damage   Chest pain: EKG, troponins, CBC, CMP, Dilaudid for pain management, statin, and stress test along with consult to Cardiology.     Disposition:   Current Living situation: home  Expected Disposition: home  Estimated D/C: 2 dayss    Diet ADULT DIET; Clear Liquid; No Caffeine   DVT Prophylaxis [] Lovenox, []  Heparin, [] SCDs, [] Ambulation,  [] Eliquis, [] Xarelto, [] Coumadin   Plavix & ASA   Code Status Full Code   Surrogate Decision Maker/ KAMLA BotelloJoanne (Child)        Personally reviewed Lab Studies and

## 2025-07-28 ENCOUNTER — APPOINTMENT (OUTPATIENT)
Age: 60
End: 2025-07-28
Payer: MEDICARE

## 2025-07-28 ENCOUNTER — APPOINTMENT (OUTPATIENT)
Dept: NUCLEAR MEDICINE | Age: 60
End: 2025-07-28
Payer: MEDICARE

## 2025-07-28 VITALS
HEIGHT: 75 IN | DIASTOLIC BLOOD PRESSURE: 83 MMHG | OXYGEN SATURATION: 97 % | TEMPERATURE: 98.6 F | HEART RATE: 60 BPM | BODY MASS INDEX: 29.47 KG/M2 | RESPIRATION RATE: 20 BRPM | WEIGHT: 237 LBS | SYSTOLIC BLOOD PRESSURE: 152 MMHG

## 2025-07-28 LAB
ANION GAP SERPL CALCULATED.3IONS-SCNC: 11 MMOL/L (ref 3–16)
BUN SERPL-MCNC: 14 MG/DL (ref 7–20)
CALCIUM SERPL-MCNC: 8.4 MG/DL (ref 8.3–10.6)
CHLORIDE SERPL-SCNC: 104 MMOL/L (ref 99–110)
CO2 SERPL-SCNC: 22 MMOL/L (ref 21–32)
CREAT SERPL-MCNC: 1.1 MG/DL (ref 0.8–1.3)
DEPRECATED RDW RBC AUTO: 12.8 % (ref 12.4–15.4)
ECHO BSA: 2.36 M2
GFR SERPLBLD CREATININE-BSD FMLA CKD-EPI: 76 ML/MIN/{1.73_M2}
GLUCOSE SERPL-MCNC: 103 MG/DL (ref 70–99)
HCT VFR BLD AUTO: 47.3 % (ref 40.5–52.5)
HGB BLD-MCNC: 16.3 G/DL (ref 13.5–17.5)
MCH RBC QN AUTO: 33.1 PG (ref 26–34)
MCHC RBC AUTO-ENTMCNC: 34.4 G/DL (ref 31–36)
MCV RBC AUTO: 96 FL (ref 80–100)
NUC REST DIASTOLIC VOLUME INDEX: 138 ML/M2
NUC REST EJECTION FRACTION: 57 %
NUC REST SYSTOLIC VOLUME INDEX: 59 ML/M2
PLATELET # BLD AUTO: 191 K/UL (ref 135–450)
PMV BLD AUTO: 7.6 FL (ref 5–10.5)
POTASSIUM SERPL-SCNC: 4.2 MMOL/L (ref 3.5–5.1)
RBC # BLD AUTO: 4.92 M/UL (ref 4.2–5.9)
SODIUM SERPL-SCNC: 137 MMOL/L (ref 136–145)
STRESS BASELINE DIAS BP: 83 MMHG
STRESS BASELINE HR: 75 BPM
STRESS BASELINE SYS BP: 132 MMHG
STRESS ESTIMATED WORKLOAD: 1.5 METS
STRESS EXERCISE DUR MIN: 1 MIN
STRESS EXERCISE DUR SEC: 40 SEC
STRESS PEAK DIAS BP: 80 MMHG
STRESS PEAK SYS BP: 166 MMHG
STRESS PERCENT HR ACHIEVED: 61 %
STRESS POST PEAK HR: 96 BPM
STRESS RATE PRESSURE PRODUCT: NORMAL BPM*MMHG
STRESS TARGET HR: 158 BPM
WBC # BLD AUTO: 5.2 K/UL (ref 4–11)

## 2025-07-28 PROCEDURE — 6360000002 HC RX W HCPCS

## 2025-07-28 PROCEDURE — 94660 CPAP INITIATION&MGMT: CPT

## 2025-07-28 PROCEDURE — 85027 COMPLETE CBC AUTOMATED: CPT

## 2025-07-28 PROCEDURE — 6370000000 HC RX 637 (ALT 250 FOR IP): Performed by: INTERNAL MEDICINE

## 2025-07-28 PROCEDURE — 6370000000 HC RX 637 (ALT 250 FOR IP)

## 2025-07-28 PROCEDURE — 80048 BASIC METABOLIC PNL TOTAL CA: CPT

## 2025-07-28 PROCEDURE — 36415 COLL VENOUS BLD VENIPUNCTURE: CPT

## 2025-07-28 PROCEDURE — 78452 HT MUSCLE IMAGE SPECT MULT: CPT | Performed by: INTERNAL MEDICINE

## 2025-07-28 PROCEDURE — 96376 TX/PRO/DX INJ SAME DRUG ADON: CPT

## 2025-07-28 PROCEDURE — 6360000002 HC RX W HCPCS: Performed by: INTERNAL MEDICINE

## 2025-07-28 PROCEDURE — 94760 N-INVAS EAR/PLS OXIMETRY 1: CPT

## 2025-07-28 PROCEDURE — G0378 HOSPITAL OBSERVATION PER HR: HCPCS

## 2025-07-28 PROCEDURE — A9502 TC99M TETROFOSMIN: HCPCS | Performed by: INTERNAL MEDICINE

## 2025-07-28 PROCEDURE — 93017 CV STRESS TEST TRACING ONLY: CPT

## 2025-07-28 PROCEDURE — 78452 HT MUSCLE IMAGE SPECT MULT: CPT

## 2025-07-28 PROCEDURE — 94640 AIRWAY INHALATION TREATMENT: CPT

## 2025-07-28 PROCEDURE — 93018 CV STRESS TEST I&R ONLY: CPT | Performed by: INTERNAL MEDICINE

## 2025-07-28 PROCEDURE — 3430000000 HC RX DIAGNOSTIC RADIOPHARMACEUTICAL: Performed by: INTERNAL MEDICINE

## 2025-07-28 PROCEDURE — 93016 CV STRESS TEST SUPVJ ONLY: CPT | Performed by: INTERNAL MEDICINE

## 2025-07-28 PROCEDURE — 2500000003 HC RX 250 WO HCPCS

## 2025-07-28 RX ORDER — RANOLAZINE 1000 MG/1
1000 TABLET, EXTENDED RELEASE ORAL 2 TIMES DAILY
Qty: 60 TABLET | Refills: 0 | Status: SHIPPED | OUTPATIENT
Start: 2025-07-28

## 2025-07-28 RX ORDER — REGADENOSON 0.08 MG/ML
0.4 INJECTION, SOLUTION INTRAVENOUS
Status: COMPLETED | OUTPATIENT
Start: 2025-07-28 | End: 2025-07-28

## 2025-07-28 RX ORDER — OXYCODONE AND ACETAMINOPHEN 5; 325 MG/1; MG/1
1 TABLET ORAL EVERY 8 HOURS PRN
Refills: 0 | Status: DISCONTINUED | OUTPATIENT
Start: 2025-07-28 | End: 2025-07-28 | Stop reason: HOSPADM

## 2025-07-28 RX ADMIN — DEXTROAMPHETAMINE SACCHARATE, AMPHETAMINE ASPARTATE, DEXTROAMPHETAMINE SULFATE AND AMPHETAMINE SULFATE 20 MG: 2.5; 2.5; 2.5; 2.5 TABLET ORAL at 13:33

## 2025-07-28 RX ADMIN — ROSUVASTATIN CALCIUM 20 MG: 20 TABLET, FILM COATED ORAL at 12:32

## 2025-07-28 RX ADMIN — BUDESONIDE AND FORMOTEROL FUMARATE DIHYDRATE 2 PUFF: 160; 4.5 AEROSOL RESPIRATORY (INHALATION) at 08:45

## 2025-07-28 RX ADMIN — RANOLAZINE 1000 MG: 500 TABLET, FILM COATED, EXTENDED RELEASE ORAL at 12:31

## 2025-07-28 RX ADMIN — CLOPIDOGREL BISULFATE 75 MG: 75 TABLET, FILM COATED ORAL at 12:32

## 2025-07-28 RX ADMIN — OXYCODONE AND ACETAMINOPHEN 1 TABLET: 5; 325 TABLET ORAL at 10:04

## 2025-07-28 RX ADMIN — SODIUM CHLORIDE, PRESERVATIVE FREE 10 ML: 5 INJECTION INTRAVENOUS at 12:32

## 2025-07-28 RX ADMIN — TIOTROPIUM BROMIDE INHALATION SPRAY 5 MCG: 3.12 SPRAY, METERED RESPIRATORY (INHALATION) at 08:45

## 2025-07-28 RX ADMIN — METOPROLOL SUCCINATE 25 MG: 25 TABLET, EXTENDED RELEASE ORAL at 12:31

## 2025-07-28 RX ADMIN — ALBUTEROL SULFATE 2 PUFF: 90 AEROSOL, METERED RESPIRATORY (INHALATION) at 17:12

## 2025-07-28 RX ADMIN — REGADENOSON 0.4 MG: 0.08 INJECTION, SOLUTION INTRAVENOUS at 10:39

## 2025-07-28 RX ADMIN — SODIUM CHLORIDE, PRESERVATIVE FREE 40 MG: 5 INJECTION INTRAVENOUS at 12:32

## 2025-07-28 RX ADMIN — TETROFOSMIN 12.7 MILLICURIE: 1.38 INJECTION, POWDER, LYOPHILIZED, FOR SOLUTION INTRAVENOUS at 08:44

## 2025-07-28 RX ADMIN — TETROFOSMIN 30.5 MILLICURIE: 1.38 INJECTION, POWDER, LYOPHILIZED, FOR SOLUTION INTRAVENOUS at 10:47

## 2025-07-28 RX ADMIN — ASPIRIN 81 MG 81 MG: 81 TABLET ORAL at 12:32

## 2025-07-28 ASSESSMENT — PAIN DESCRIPTION - LOCATION: LOCATION: SHOULDER

## 2025-07-28 ASSESSMENT — PAIN SCALES - GENERAL: PAINLEVEL_OUTOF10: 9

## 2025-07-28 ASSESSMENT — PAIN DESCRIPTION - ORIENTATION: ORIENTATION: RIGHT;LEFT

## 2025-07-28 NOTE — PLAN OF CARE
Problem: Discharge Planning  Goal: Discharge to home or other facility with appropriate resources  7/28/2025 1814 by Mamie Oconnor RN  Outcome: Progressing  Flowsheets (Taken 7/28/2025 0805)  Discharge to home or other facility with appropriate resources:   Identify barriers to discharge with patient and caregiver   Identify discharge learning needs (meds, wound care, etc)  7/28/2025 0629 by Sharda Ríos RN  Outcome: Progressing     Problem: Safety - Adult  Goal: Free from fall injury  7/28/2025 1814 by Mamie Oconnor RN  Outcome: Progressing  7/28/2025 0629 by Sharda Ríos RN  Outcome: Progressing     Problem: Pain  Goal: Verbalizes/displays adequate comfort level or baseline comfort level  7/28/2025 1814 by Mamie Oconnor RN  Outcome: Progressing  7/28/2025 0629 by Sharda Ríos RN  Outcome: Progressing

## 2025-07-28 NOTE — CARE COORDINATION
Advance Care Planning   Healthcare Decision Maker:    Primary Decision Maker: Joanne Botello - Child - [phone]    Today we documented Decision Maker(s) consistent with Legal Next of Kin hierarchy.        Electronically signed by Brandy Xiao on 7/28/2025 at 2:59 PM

## 2025-07-28 NOTE — CARE COORDINATION
Case Management Assessment  Initial Evaluation    Date/Time of Evaluation: 7/28/2025 2:59 PM  Assessment Completed by: Brandy Xiao    If patient is discharged prior to next notation, then this note serves as note for discharge by case management.    Patient Name: Carlos Ricks                   YOB: 1965  Diagnosis: Palpitations [R00.2]  Nausea [R11.0]  Chest pain with high risk of acute coronary syndrome [R07.9]  Other fatigue [R53.83]  Chest pain, unspecified type [R07.9]  History of CAD (coronary artery disease) [Z86.79]  Chest pain, atypical [R07.89]                   Date / Time: 7/26/2025  7:54 PM    Patient Admission Status: Observation   Readmission Risk (Low < 19, Mod (19-27), High > 27): No data recorded  Current PCP: Matteo Johnston Jr., MD  PCP verified by CM? Yes    Chart Reviewed: Yes      History Provided by: Patient  Patient Orientation: Alert and Oriented    Patient Cognition: Alert    Hospitalization in the last 30 days (Readmission):  No    If yes, Readmission Assessment in CM Navigator will be completed.    Advance Directives:      Code Status: Full Code   Patient's Primary Decision Maker is: Legal Next of Kin    Primary Decision Maker: Joanne Botello - Child - [phone]    Discharge Planning:    Patient lives with: Spouse/Significant Other Type of Home: Trailer/Mobile Home  Primary Care Giver: Self  Patient Support Systems include: Spouse/Significant Other, Children   Current Financial resources: Medicare, Medicaid  Current community resources: None  Current services prior to admission: None            Current DME:              Type of Home Care services:  None    ADLS  Prior functional level: Independent in ADLs/IADLs  Current functional level: Independent in ADLs/IADLs    PT AM-PAC:   /24  OT AM-PAC:   /24    Family can provide assistance at DC: Yes  Would you like Case Management to discuss the discharge plan with any other family members/significant others, and if so,

## 2025-07-28 NOTE — CARE COORDINATION
Case Management Discharge Note          Date / Time of Note: 7/28/2025 5:45 PM                  Patient Name: Carlos Ricks   YOB: 1965  Diagnosis: Palpitations [R00.2]  Nausea [R11.0]  Chest pain with high risk of acute coronary syndrome [R07.9]  Other fatigue [R53.83]  Chest pain, unspecified type [R07.9]  History of CAD (coronary artery disease) [Z86.79]  Chest pain, atypical [R07.89]   Date / Time: 7/26/2025  7:54 PM    Financial:  Payor: [employer]ICARE / Plan: [employer] MEDICARE ADVANTAGE HMO / Product Type: Medicare /      Pharmacy:    [facility] [id number] - [place], [place] - [address] [place] [phone] - F [phone]  [address] [place] [address]  Phone: [phone] Fax: [phone]      Assistance purchasing medications?: Potential Assistance Purchasing Medications: No  Assistance provided by Case Management: None at this time    DISCHARGE Disposition: Home- No Services Needed    Transportation:  Transportation PLAN for discharge: self   Mode of Transport: Private Car  Reason for medical transport: Not Applicable  Name of Transport Company: Not Applicable    Transport form completed: Not Indicated    IMM Completed:   Not Indicated OBS    Additional CM Notes: Patient discharging home with family support.  No discharge needs.    The Plan for Transition of Care is related to the following treatment goals of Palpitations [R00.2]  Nausea [R11.0]  Chest pain with high risk of acute coronary syndrome [R07.9]  Other fatigue [R53.83]  Chest pain, unspecified type [R07.9]  History of CAD (coronary artery disease) [Z86.79]  Chest pain, atypical [R07.89]    The Patient and/or patient representative Carlos and his family were provided with a choice of provider and agrees with the discharge plan Yes    Freedom of choice list was provided with basic dialogue that supports the patient's individualized plan of care/goals and shares the quality data associated with the providers.

## 2025-07-28 NOTE — PROGRESS NOTES
4 Eyes Skin Assessment     NAME:  Carlos Ricks  YOB: 1965  MEDICAL RECORD NUMBER:  [id number]    The patient is being assessed for  Admission    I agree that at least one RN has performed a thorough Head to Toe Skin Assessment on the patient. ALL assessment sites listed below have been assessed.      Areas assessed by both nurses:    Head, Face, Ears, Shoulders, Back, Chest, Arms, Elbows, Hands, Sacrum. Buttock, Coccyx, Ischium, and Legs. Feet and Heels        Does the Patient have a Wound? No noted wound(s)       Edwin Prevention initiated by RN: Yes  Wound Care Orders initiated by RN: No    For hospital-acquired stage 1 & 2 and ALL Stage 3,4, Unstageable, DTI, NWPT, and Complex wounds: place order “IP Wound Care/Ostomy Nurse Eval and Treat” by RN under : No    New Ostomies, if present place, Ostomy referral order under : No     Nurse 1 eSignature: Electronically signed by Sharda Ríos RN on 7/27/25 at 2:31 AM EDT    **SHARE this note so that the co-signing nurse can place an eSignature**    Nurse 2 eSignature: Electronically signed by Diana Garcia RN on 7/27/25 at 6:00 AM EDT   
Patient arrived [facility] ED via stretcher, transferred to bed safely. Initial vital signs taken and recorded, initial assessment done. IV line noted on the right antecubital vein , on saline lock with dry and intact dressing. Patient hooked to telemetry monitoring. Attending physician informed that patient is on the floor via perfectserve. Patient orientation done and safety measures to prevent fall done. Patient's significant other at bedside. Instructed patient to call anytime for any assistance.   
Patient seen and examined at bedside.  Reports improvement in chest pain for now.  Plan for stress test tomorrow, cardiology following  
Reviewed discharge instructions and medication changes with pt. Pt verbalized understanding. Remove pt IV without complications. Pt ambulated to elevator with wife to home.   
Statement Selected

## 2025-07-28 NOTE — PLAN OF CARE
Problem: Discharge Planning  Goal: Discharge to home or other facility with appropriate resources  7/28/2025 1815 by Mamie Oconnor RN  Outcome: Adequate for Discharge  7/28/2025 1814 by Mamie Oconnor RN  Outcome: Progressing  Flowsheets (Taken 7/28/2025 0805)  Discharge to home or other facility with appropriate resources:   Identify barriers to discharge with patient and caregiver   Identify discharge learning needs (meds, wound care, etc)  7/28/2025 0629 by Sharda Ríos RN  Outcome: Progressing     Problem: Safety - Adult  Goal: Free from fall injury  7/28/2025 1815 by Mamie Oconnor RN  Outcome: Adequate for Discharge  7/28/2025 1814 by Mamie Oconnor RN  Outcome: Progressing  7/28/2025 0629 by Sharda Ríos RN  Outcome: Progressing     Problem: Pain  Goal: Verbalizes/displays adequate comfort level or baseline comfort level  7/28/2025 1815 by Mamie Oconnor RN  Outcome: Adequate for Discharge  7/28/2025 1814 by Mamie Oconnor RN  Outcome: Progressing  7/28/2025 0629 by Sharda Ríos RN  Outcome: Progressing

## 2025-07-30 ENCOUNTER — LAB (OUTPATIENT)
Dept: CARDIOLOGY CLINIC | Age: 60
End: 2025-07-30

## 2025-07-30 DIAGNOSIS — R07.89 ATYPICAL CHEST PAIN: Primary | ICD-10-CM

## 2025-07-30 DIAGNOSIS — R00.2 PALPITATIONS: ICD-10-CM

## 2025-07-30 NOTE — DISCHARGE SUMMARY
Name:  Carlos Ricks /Age/Sex: 1965 (60 y.o. male)   Admit Date: 2025  Discharge Date: 2025    MRN & CSN:  [id number] & [id number] Encounter Date : 2025    Attending:  No att. providers found Discharging Provider: Abbie Mendoza MD     Hospital Course:      Carlos Ricks is a 60 y.o. male who presented with     Chief Complaint   Patient presents with    Chest Pain     Pt presents to ED with reports of chest pain that is sharp in nature, reports it has been on going for a couple of days and worse. Pt reports feeling faint,  but no  LOC, pt reports taking  two nitro and  one baby aspirin.         The patient was being managed in the hospital for    Chest pain  CAD with history of prior STEMI   Troponin negative   EKG negative for ischemia   Continued on home aspirin Plavix and statin   Cardiology was consulted and recommended to increase home Ranexa to 1000 mg twice daily   Stress test was done which was negative for ischemia   Patient was ordered 30-day event monitor for palpitations and will need outpatient follow-up for further evaluation    On the basis of discharge, patient reported feeling stable. The patient was found to not be in any acute distress. Further, the patient expressed appropriate understanding of, and agreement with, the discharge recommendations, medications and plan.     Follow up appointments :  Primary care physician: Matteo Johnston Jr., MD within 1 week, cardiology in 1 week    Consults this admission:  IP CONSULT TO HOSPITALIST  IP CONSULT TO CARDIOLOGY    Discharge Diagnosis:   Chest pain with high risk of acute coronary syndrome    Discharge Instruction:     Activity: activity as tolerated  Condition on discharge: Stable   Discharge to : Home    Discharge Medications:        Medication List        CHANGE how you take these medications      ranolazine 1000 MG extended release tablet  Commonly known as: RANEXA  Take 1 tablet by mouth 2 times daily  What

## 2025-08-07 ENCOUNTER — OFFICE VISIT (OUTPATIENT)
Dept: CARDIOLOGY CLINIC | Age: 60
End: 2025-08-07
Payer: MEDICARE

## 2025-08-07 VITALS
HEIGHT: 75 IN | WEIGHT: 230 LBS | OXYGEN SATURATION: 97 % | BODY MASS INDEX: 28.6 KG/M2 | HEART RATE: 86 BPM | DIASTOLIC BLOOD PRESSURE: 90 MMHG | SYSTOLIC BLOOD PRESSURE: 130 MMHG

## 2025-08-07 DIAGNOSIS — I25.10 CORONARY ARTERY DISEASE INVOLVING NATIVE CORONARY ARTERY OF NATIVE HEART WITHOUT ANGINA PECTORIS: ICD-10-CM

## 2025-08-07 DIAGNOSIS — R00.2 PALPITATIONS: Primary | ICD-10-CM

## 2025-08-07 PROCEDURE — 3075F SYST BP GE 130 - 139MM HG: CPT | Performed by: INTERNAL MEDICINE

## 2025-08-07 PROCEDURE — 3080F DIAST BP >= 90 MM HG: CPT | Performed by: INTERNAL MEDICINE

## 2025-08-07 PROCEDURE — 93000 ELECTROCARDIOGRAM COMPLETE: CPT | Performed by: INTERNAL MEDICINE

## 2025-08-07 PROCEDURE — 99214 OFFICE O/P EST MOD 30 MIN: CPT | Performed by: INTERNAL MEDICINE
